# Patient Record
Sex: MALE | Race: WHITE | NOT HISPANIC OR LATINO
[De-identification: names, ages, dates, MRNs, and addresses within clinical notes are randomized per-mention and may not be internally consistent; named-entity substitution may affect disease eponyms.]

---

## 2017-01-04 ENCOUNTER — APPOINTMENT (OUTPATIENT)
Dept: PLASTIC SURGERY | Facility: CLINIC | Age: 57
End: 2017-01-04

## 2017-06-08 ENCOUNTER — APPOINTMENT (OUTPATIENT)
Dept: SURGICAL ONCOLOGY | Facility: CLINIC | Age: 57
End: 2017-06-08

## 2019-01-14 ENCOUNTER — LABORATORY RESULT (OUTPATIENT)
Age: 59
End: 2019-01-14

## 2019-01-14 ENCOUNTER — APPOINTMENT (OUTPATIENT)
Dept: INTERNAL MEDICINE | Facility: CLINIC | Age: 59
End: 2019-01-14
Payer: MEDICAID

## 2019-01-14 ENCOUNTER — OUTPATIENT (OUTPATIENT)
Dept: OUTPATIENT SERVICES | Facility: HOSPITAL | Age: 59
LOS: 1 days | End: 2019-01-14
Payer: MEDICAID

## 2019-01-14 VITALS
WEIGHT: 162 LBS | DIASTOLIC BLOOD PRESSURE: 70 MMHG | HEIGHT: 73 IN | BODY MASS INDEX: 21.47 KG/M2 | SYSTOLIC BLOOD PRESSURE: 130 MMHG

## 2019-01-14 DIAGNOSIS — Z87.19 PERSONAL HISTORY OF OTHER DISEASES OF THE DIGESTIVE SYSTEM: ICD-10-CM

## 2019-01-14 DIAGNOSIS — R20.2 ANESTHESIA OF SKIN: ICD-10-CM

## 2019-01-14 DIAGNOSIS — Z86.39 PERSONAL HISTORY OF OTHER ENDOCRINE, NUTRITIONAL AND METABOLIC DISEASE: ICD-10-CM

## 2019-01-14 DIAGNOSIS — Z87.898 PERSONAL HISTORY OF OTHER SPECIFIED CONDITIONS: ICD-10-CM

## 2019-01-14 DIAGNOSIS — Z82.3 FAMILY HISTORY OF STROKE: ICD-10-CM

## 2019-01-14 DIAGNOSIS — R20.0 ANESTHESIA OF SKIN: ICD-10-CM

## 2019-01-14 DIAGNOSIS — Z00.00 ENCOUNTER FOR GENERAL ADULT MEDICAL EXAMINATION W/OUT ABNORMAL FINDINGS: ICD-10-CM

## 2019-01-14 DIAGNOSIS — Z87.09 PERSONAL HISTORY OF OTHER DISEASES OF THE RESPIRATORY SYSTEM: ICD-10-CM

## 2019-01-14 DIAGNOSIS — Z87.39 PERSONAL HISTORY OF OTHER DISEASES OF THE MUSCULOSKELETAL SYSTEM AND CONNECTIVE TISSUE: ICD-10-CM

## 2019-01-14 DIAGNOSIS — K50.90 CROHN'S DISEASE, UNSPECIFIED, WITHOUT COMPLICATIONS: ICD-10-CM

## 2019-01-14 DIAGNOSIS — I10 ESSENTIAL (PRIMARY) HYPERTENSION: ICD-10-CM

## 2019-01-14 DIAGNOSIS — K52.9 NONINFECTIVE GASTROENTERITIS AND COLITIS, UNSPECIFIED: ICD-10-CM

## 2019-01-14 DIAGNOSIS — Z86.69 PERSONAL HISTORY OF OTHER DISEASES OF THE NERVOUS SYSTEM AND SENSE ORGANS: ICD-10-CM

## 2019-01-14 DIAGNOSIS — H93.90 UNSPECIFIED DISORDER OF EAR, UNSPECIFIED EAR: ICD-10-CM

## 2019-01-14 DIAGNOSIS — Z87.448 PERSONAL HISTORY OF OTHER DISEASES OF URINARY SYSTEM: ICD-10-CM

## 2019-01-14 DIAGNOSIS — Z80.9 FAMILY HISTORY OF MALIGNANT NEOPLASM, UNSPECIFIED: ICD-10-CM

## 2019-01-14 DIAGNOSIS — D03.39 MELANOMA IN SITU OF OTHER PARTS OF FACE: ICD-10-CM

## 2019-01-14 DIAGNOSIS — K50.90 CROHN'S DISEASE, UNSPECIFIED, W/OUT COMPLICATIONS: ICD-10-CM

## 2019-01-14 DIAGNOSIS — K31.9 DISEASE OF STOMACH AND DUODENUM, UNSPECIFIED: ICD-10-CM

## 2019-01-14 DIAGNOSIS — M54.5 LOW BACK PAIN: ICD-10-CM

## 2019-01-14 PROCEDURE — 99202 OFFICE O/P NEW SF 15 MIN: CPT | Mod: GE

## 2019-01-14 PROCEDURE — 80053 COMPREHEN METABOLIC PANEL: CPT

## 2019-01-14 PROCEDURE — G0463: CPT

## 2019-01-14 PROCEDURE — 80061 LIPID PANEL: CPT

## 2019-01-14 PROCEDURE — 83036 HEMOGLOBIN GLYCOSYLATED A1C: CPT

## 2019-01-14 PROCEDURE — 84443 ASSAY THYROID STIM HORMONE: CPT

## 2019-01-14 PROCEDURE — 85027 COMPLETE CBC AUTOMATED: CPT

## 2019-01-15 LAB
ALBUMIN SERPL ELPH-MCNC: 4.4 G/DL — SIGNIFICANT CHANGE UP (ref 3.3–5)
ALP SERPL-CCNC: 60 U/L — SIGNIFICANT CHANGE UP (ref 40–120)
ALT FLD-CCNC: 23 U/L — SIGNIFICANT CHANGE UP (ref 10–45)
ANION GAP SERPL CALC-SCNC: 12 MMOL/L — SIGNIFICANT CHANGE UP (ref 5–17)
AST SERPL-CCNC: 25 U/L — SIGNIFICANT CHANGE UP (ref 10–40)
BASOPHILS # BLD AUTO: 0.07 K/UL — SIGNIFICANT CHANGE UP (ref 0–0.2)
BASOPHILS NFR BLD AUTO: 0.8 % — SIGNIFICANT CHANGE UP (ref 0–2)
BILIRUB SERPL-MCNC: 0.6 MG/DL — SIGNIFICANT CHANGE UP (ref 0.2–1.2)
BUN SERPL-MCNC: 10 MG/DL — SIGNIFICANT CHANGE UP (ref 7–23)
CALCIUM SERPL-MCNC: 9.7 MG/DL — SIGNIFICANT CHANGE UP (ref 8.4–10.5)
CHLORIDE SERPL-SCNC: 104 MMOL/L — SIGNIFICANT CHANGE UP (ref 96–108)
CHOLEST SERPL-MCNC: 265 MG/DL — HIGH (ref 10–199)
CO2 SERPL-SCNC: 27 MMOL/L — SIGNIFICANT CHANGE UP (ref 22–31)
CREAT SERPL-MCNC: 0.79 MG/DL — SIGNIFICANT CHANGE UP (ref 0.5–1.3)
EOSINOPHIL # BLD AUTO: 0.25 K/UL — SIGNIFICANT CHANGE UP (ref 0–0.5)
EOSINOPHIL NFR BLD AUTO: 2.9 % — SIGNIFICANT CHANGE UP (ref 0–6)
GLUCOSE SERPL-MCNC: 82 MG/DL — SIGNIFICANT CHANGE UP (ref 70–99)
HBA1C BLD-MCNC: 5 % — SIGNIFICANT CHANGE UP (ref 4–5.6)
HCT VFR BLD CALC: 46.7 % — SIGNIFICANT CHANGE UP (ref 39–50)
HDLC SERPL-MCNC: 27 MG/DL — LOW
HGB BLD-MCNC: 15.1 G/DL — SIGNIFICANT CHANGE UP (ref 13–17)
IMM GRANULOCYTES NFR BLD AUTO: 0.6 % — SIGNIFICANT CHANGE UP (ref 0–1.5)
LIPID PNL WITH DIRECT LDL SERPL: 165 MG/DL — HIGH
LYMPHOCYTES # BLD AUTO: 1.61 K/UL — SIGNIFICANT CHANGE UP (ref 1–3.3)
LYMPHOCYTES # BLD AUTO: 18.8 % — SIGNIFICANT CHANGE UP (ref 13–44)
MANUAL SMEAR VERIFICATION: SIGNIFICANT CHANGE UP
MCHC RBC-ENTMCNC: 30.5 PG — SIGNIFICANT CHANGE UP (ref 27–34)
MCHC RBC-ENTMCNC: 32.3 GM/DL — SIGNIFICANT CHANGE UP (ref 32–36)
MCV RBC AUTO: 94.3 FL — SIGNIFICANT CHANGE UP (ref 80–100)
MONOCYTES # BLD AUTO: 0.43 K/UL — SIGNIFICANT CHANGE UP (ref 0–0.9)
MONOCYTES NFR BLD AUTO: 5 % — SIGNIFICANT CHANGE UP (ref 2–14)
NEUTROPHILS # BLD AUTO: 6.15 K/UL — SIGNIFICANT CHANGE UP (ref 1.8–7.4)
NEUTROPHILS NFR BLD AUTO: 71.9 % — SIGNIFICANT CHANGE UP (ref 43–77)
PLAT MORPH BLD: NORMAL — SIGNIFICANT CHANGE UP
PLATELET # BLD AUTO: 216 K/UL — SIGNIFICANT CHANGE UP (ref 150–400)
POTASSIUM SERPL-MCNC: 4.6 MMOL/L — SIGNIFICANT CHANGE UP (ref 3.5–5.3)
POTASSIUM SERPL-SCNC: 4.6 MMOL/L — SIGNIFICANT CHANGE UP (ref 3.5–5.3)
PROT SERPL-MCNC: 7.1 G/DL — SIGNIFICANT CHANGE UP (ref 6–8.3)
RBC # BLD: 4.95 M/UL — SIGNIFICANT CHANGE UP (ref 4.2–5.8)
RBC # FLD: 13.5 % — SIGNIFICANT CHANGE UP (ref 10.3–14.5)
RBC BLD AUTO: NORMAL — SIGNIFICANT CHANGE UP
SODIUM SERPL-SCNC: 143 MMOL/L — SIGNIFICANT CHANGE UP (ref 135–145)
T4 FREE+ TSH PNL SERPL: 2.25 UIU/ML — SIGNIFICANT CHANGE UP (ref 0.27–4.2)
TOTAL CHOLESTEROL/HDL RATIO MEASUREMENT: 9.8 RATIO — HIGH (ref 3.4–9.6)
TRIGL SERPL-MCNC: 366 MG/DL — HIGH (ref 10–149)
WBC # BLD: 8.56 K/UL — SIGNIFICANT CHANGE UP (ref 3.8–10.5)
WBC # FLD AUTO: 8.56 K/UL — SIGNIFICANT CHANGE UP (ref 3.8–10.5)

## 2019-01-19 PROBLEM — Z86.69 HISTORY OF HEARING LOSS: Status: RESOLVED | Noted: 2019-01-19 | Resolved: 2019-01-19

## 2019-01-19 PROBLEM — Z00.00 ENCOUNTER FOR WELLNESS EXAMINATION IN ADULT: Status: ACTIVE | Noted: 2019-01-19

## 2019-01-19 NOTE — PHYSICAL EXAM
[No Acute Distress] : no acute distress [Well-Appearing] : well-appearing [Normal Sclera/Conjunctiva] : normal sclera/conjunctiva [PERRL] : pupils equal round and reactive to light [EOMI] : extraocular movements intact [Normal Outer Ear/Nose] : the outer ears and nose were normal in appearance [Normal Oropharynx] : the oropharynx was normal [No JVD] : no jugular venous distention [Supple] : supple [Thyroid Normal, No Nodules] : the thyroid was normal and there were no nodules present [Clear to Auscultation] : lungs were clear to auscultation bilaterally [Normal Rate] : normal rate  [Regular Rhythm] : with a regular rhythm [Normal S1, S2] : normal S1 and S2 [No Murmur] : no murmur heard [No Carotid Bruits] : no carotid bruits [Pedal Pulses Present] : the pedal pulses are present [No Edema] : there was no peripheral edema [No Extremity Clubbing/Cyanosis] : no extremity clubbing/cyanosis [Soft] : abdomen soft [Non Tender] : non-tender [Non-distended] : non-distended [No Masses] : no abdominal mass palpated [No HSM] : no HSM [Normal Bowel Sounds] : normal bowel sounds [Normal Supraclavicular Nodes] : no supraclavicular lymphadenopathy [Normal Posterior Cervical Nodes] : no posterior cervical lymphadenopathy [Normal Anterior Cervical Nodes] : no anterior cervical lymphadenopathy [No Spinal Tenderness] : no spinal tenderness [No Joint Swelling] : no joint swelling [Grossly Normal Strength/Tone] : grossly normal strength/tone [No Skin Lesions] : no skin lesions [Normal Gait] : normal gait [Coordination Grossly Intact] : coordination grossly intact [No Focal Deficits] : no focal deficits [Normal Affect] : the affect was normal [Alert and Oriented x3] : oriented to person, place, and time [Normal Mood] : the mood was normal

## 2019-01-21 NOTE — HEALTH RISK ASSESSMENT
[Good] : ~his/her~  mood as  good [No falls in past year] : Patient reported no falls in the past year [0] : 2) Feeling down, depressed, or hopeless: Not at all (0) [Patient reported colonoscopy was normal] : Patient reported colonoscopy was normal [Alone] : lives alone [Employed] : employed [Single] : single [Sexually Active] : sexually active [Fully functional (bathing, dressing, toileting, transferring, walking, feeding)] : Fully functional (bathing, dressing, toileting, transferring, walking, feeding) [Fully functional (using the telephone, shopping, preparing meals, housekeeping, doing laundry, using] : Fully functional and needs no help or supervision to perform IADLs (using the telephone, shopping, preparing meals, housekeeping, doing laundry, using transportation, managing medications and managing finances) [] : No [de-identified] : Social alcohol use. [[id number]] : 0 [High Risk Behavior] : no high risk behavior [Reports changes in hearing] : Reports no changes in hearing [Reports changes in vision] : Reports no changes in vision [Reports changes in dental health] : Reports no changes in dental health [ColonoscopyDate] : 01/2016 [FreeTextEntry2] :

## 2019-01-21 NOTE — HISTORY OF PRESENT ILLNESS
[FreeTextEntry1] : 58M with PMH of Crohn's disease s/p partial colectomy (1994), chronic lower back pain s/p laminectomy x2 (2015) and L nasal melanoma s/p excision (2016) presenting to re-establish care.  [de-identified] : Last seen in clinic in 2013. Has history of melanoma and follows with dermatologist every 6 months, however, has not seen PCP since last clinic visit. Saw dermatologist last week for skin check and was told everything was normal. \par \par Otherwise reports feeling well. Has Crohn's disease that he reports is in remission. Sees gastroenterologist (Dr. Matt Cantu, [place]) and is being managed without medications. Denies symptoms of colitis. Had partial colectomy in 1994 and was treated with steroids for many years. Mentions that he "always has high cholesterol" due to his Crohn's disease. Was told this by a doctor in the past who said this is normal for him and does not need to be treated. Does not recall which doctor told him this. Last colonoscopy was in December 2016; was told everything is normal and to return in 3-5 years. \par \par Has chronic back pain for which he used to take various pain medications, but had two laminectomies at [place] (June and October 2015). After this, pain has been relatively well-controlled. Takes Tylenol PRN. Also does exercises he learned at physical therapy and swims several times per week.

## 2019-01-21 NOTE — REVIEW OF SYSTEMS
[Back Pain] : back pain [Negative] : Heme/Lymph [Fever] : no fever [Chills] : no chills [Recent Change In Weight] : ~T no recent weight change [Vision Problems] : no vision problems [Chest Pain] : no chest pain [Palpitations] : no palpitations [Lower Ext Edema] : no lower extremity edema [Orthopena] : no orthopnea [Paroysmal Nocturnal Dyspnea] : no paroysmal nocturnal dyspnea [Cough] : no cough [Dyspnea on Exertion] : not dyspnea on exertion [Abdominal Pain] : no abdominal pain [Nausea] : no nausea [Constipation] : no constipation [Diarrhea] : no diarrhea [Vomiting] : no vomiting [Melena] : no melena [Dysuria] : no dysuria [Nocturia] : no nocturia [Hematuria] : no hematuria [Joint Pain] : no joint pain [Joint Stiffness] : no joint stiffness [Muscle Pain] : no muscle pain [Muscle Weakness] : no muscle weakness [Joint Swelling] : no joint swelling [Mole Changes] : no mole changes [Skin Rash] : no skin rash [Headache] : no headache [Dizziness] : no dizziness [Insomnia] : no insomnia [Anxiety] : no anxiety [Depression] : no depression

## 2019-04-17 ENCOUNTER — APPOINTMENT (OUTPATIENT)
Dept: INTERNAL MEDICINE | Facility: CLINIC | Age: 59
End: 2019-04-17
Payer: MEDICAID

## 2019-04-17 ENCOUNTER — OUTPATIENT (OUTPATIENT)
Dept: OUTPATIENT SERVICES | Facility: HOSPITAL | Age: 59
LOS: 1 days | End: 2019-04-17
Payer: MEDICAID

## 2019-04-17 VITALS
HEIGHT: 73 IN | WEIGHT: 162 LBS | OXYGEN SATURATION: 97 % | DIASTOLIC BLOOD PRESSURE: 70 MMHG | SYSTOLIC BLOOD PRESSURE: 120 MMHG | HEART RATE: 80 BPM | BODY MASS INDEX: 21.47 KG/M2

## 2019-04-17 DIAGNOSIS — I10 ESSENTIAL (PRIMARY) HYPERTENSION: ICD-10-CM

## 2019-04-17 PROCEDURE — G0463: CPT

## 2019-04-17 PROCEDURE — 99213 OFFICE O/P EST LOW 20 MIN: CPT | Mod: GE

## 2019-04-19 ENCOUNTER — APPOINTMENT (OUTPATIENT)
Dept: SURGERY | Facility: CLINIC | Age: 59
End: 2019-04-19
Payer: MEDICAID

## 2019-04-19 ENCOUNTER — TRANSCRIPTION ENCOUNTER (OUTPATIENT)
Age: 59
End: 2019-04-19

## 2019-04-19 VITALS
HEIGHT: 73 IN | WEIGHT: 170 LBS | DIASTOLIC BLOOD PRESSURE: 78 MMHG | OXYGEN SATURATION: 98 % | SYSTOLIC BLOOD PRESSURE: 143 MMHG | HEART RATE: 85 BPM | TEMPERATURE: 97.9 F | RESPIRATION RATE: 15 BRPM | BODY MASS INDEX: 22.53 KG/M2

## 2019-04-19 PROCEDURE — 99204 OFFICE O/P NEW MOD 45 MIN: CPT

## 2019-04-19 RX ORDER — ACETAMINOPHEN 500 MG/1
500 TABLET, COATED ORAL
Refills: 0 | Status: ACTIVE | COMMUNITY

## 2019-04-19 NOTE — REASON FOR VISIT
[Consultation] : a consultation visit [FreeTextEntry1] : Referred by Dr. Umu Calderón Bilateral inguinal hernia

## 2019-04-19 NOTE — PHYSICAL EXAM
[Normal Breath Sounds] : Normal breath sounds [Normal Heart Sounds] : normal heart sounds [No HSM] : no hepatosplenomegaly [Tender] : was nontender [de-identified] : Within normal limits [de-identified] : Normal [de-identified] : Poor dentition otherwise normal [de-identified] : soft non Distended nontender\par Bilateral Inguinal hernias left greater than right

## 2019-04-19 NOTE — CONSULT LETTER
[Dear  ___] : Dear  [unfilled], [Please see my note below.] : Please see my note below. [Consult Letter:] : I had the pleasure of evaluating your patient, [unfilled]. [Consult Closing:] : Thank you very much for allowing me to participate in the care of this patient.  If you have any questions, please do not hesitate to contact me. [FreeTextEntry3] : Chaim Skelton MD, FACS, FASMBS\par Chief Division of Minimally Invasive Surgery\par Co-Director of Bariatric Surgery \par [facility]\[place] [address] [Sincerely,] : Sincerely,

## 2019-04-19 NOTE — HISTORY OF PRESENT ILLNESS
[de-identified] : Jarad is a 60 y/o male here for consultation for a left inguinal hernia. 59-year-old male here for evaluation initially for left inguinal hernia. He's had slight discomfort but no pain. He is here for evaluation for repair. He has a past history of Crohn's disease with surgery there

## 2019-04-19 NOTE — ASSESSMENT
[FreeTextEntry1] : 59-year-old male with bilateral inguinal hernias left greater than right. Given his previous surgical history including a lower midline incision laparoscopic approach is not indicated and would opt for 2 open procedures. I've explained this to him all his questions were answered the risks benefits and expectations were discussed at length with the patient he would like to proceed.

## 2019-04-19 NOTE — REVIEW OF SYSTEMS
[As Noted in HPI] : as noted in HPI [Negative] : Heme/Lymph [FreeTextEntry7] : Crohn's disease  [de-identified] : Previous history of melanoma resection of the face

## 2019-04-30 DIAGNOSIS — K40.90 UNILATERAL INGUINAL HERNIA, WITHOUT OBSTRUCTION OR GANGRENE, NOT SPECIFIED AS RECURRENT: ICD-10-CM

## 2019-04-30 NOTE — PLAN
[FreeTextEntry1] : 59M with PMH of Crohn's disease s/p partial colectomy (1994), chronic lower back pain s/p laminectomy x2 (2015) and L nasal melanoma s/p excision (2016) presenting after being found to have a left inguinal hernia\par \par Problem: left inguinal mass\par Assessment: likely inguinal hernia\par Plan: Surgery referral\par

## 2019-04-30 NOTE — HISTORY OF PRESENT ILLNESS
[FreeTextEntry1] : f/u [de-identified] : 59M with PMH of Crohn's disease s/p partial colectomy (1994), chronic lower back pain s/p laminectomy x2 (2015) and L nasal melanoma s/p excision (2016) presenting after being found to have a left inguinal hernia by his dermatologist earlier this week during a 6 month full body cancer screen. The patient reports he has no pain in the area and did not notice it before being told about it by his dermatologist. He is very active. He swims regularly and plays in a bowling league. He is requesting a referral to go see a surgeon about repair.

## 2019-04-30 NOTE — PHYSICAL EXAM
[No Acute Distress] : no acute distress [Well Nourished] : well nourished [Well Developed] : well developed [Normal Sclera/Conjunctiva] : normal sclera/conjunctiva [Well-Appearing] : well-appearing [No JVD] : no jugular venous distention [Normal Outer Ear/Nose] : the outer ears and nose were normal in appearance [No Respiratory Distress] : no respiratory distress  [Clear to Auscultation] : lungs were clear to auscultation bilaterally [No Accessory Muscle Use] : no accessory muscle use [Regular Rhythm] : with a regular rhythm [Normal Rate] : normal rate  [Normal S1, S2] : normal S1 and S2 [No Murmur] : no murmur heard [No Edema] : there was no peripheral edema [Soft] : abdomen soft [Non Tender] : non-tender [Non-distended] : non-distended [No Joint Swelling] : no joint swelling [No Rash] : no rash [Normal Gait] : normal gait [Normal Affect] : the affect was normal [Normal Insight/Judgement] : insight and judgment were intact [de-identified] : well healed old abdominal scars anterior abdomen [de-identified] : mass left inguinal area. testicles appear normal in size and shape. No TTP to palpation of testicles, no fullness.

## 2019-05-06 ENCOUNTER — OUTPATIENT (OUTPATIENT)
Dept: OUTPATIENT SERVICES | Facility: HOSPITAL | Age: 59
LOS: 1 days | End: 2019-05-06
Payer: MEDICAID

## 2019-05-06 VITALS
OXYGEN SATURATION: 96 % | SYSTOLIC BLOOD PRESSURE: 125 MMHG | WEIGHT: 169.98 LBS | DIASTOLIC BLOOD PRESSURE: 82 MMHG | HEIGHT: 73 IN | TEMPERATURE: 98 F | RESPIRATION RATE: 14 BRPM | HEART RATE: 80 BPM

## 2019-05-06 DIAGNOSIS — K40.20 BILATERAL INGUINAL HERNIA, WITHOUT OBSTRUCTION OR GANGRENE, NOT SPECIFIED AS RECURRENT: ICD-10-CM

## 2019-05-06 DIAGNOSIS — Z98.890 OTHER SPECIFIED POSTPROCEDURAL STATES: Chronic | ICD-10-CM

## 2019-05-06 DIAGNOSIS — Z01.84 ENCOUNTER FOR ANTIBODY RESPONSE EXAMINATION: ICD-10-CM

## 2019-05-06 LAB
ANION GAP SERPL CALC-SCNC: 12 MMOL/L — SIGNIFICANT CHANGE UP (ref 5–17)
BUN SERPL-MCNC: 10 MG/DL — SIGNIFICANT CHANGE UP (ref 7–23)
CALCIUM SERPL-MCNC: 9.5 MG/DL — SIGNIFICANT CHANGE UP (ref 8.4–10.5)
CHLORIDE SERPL-SCNC: 107 MMOL/L — SIGNIFICANT CHANGE UP (ref 96–108)
CO2 SERPL-SCNC: 24 MMOL/L — SIGNIFICANT CHANGE UP (ref 22–31)
CREAT SERPL-MCNC: 0.91 MG/DL — SIGNIFICANT CHANGE UP (ref 0.5–1.3)
GLUCOSE SERPL-MCNC: 93 MG/DL — SIGNIFICANT CHANGE UP (ref 70–99)
HCT VFR BLD CALC: 47.1 % — SIGNIFICANT CHANGE UP (ref 39–50)
HGB BLD-MCNC: 15.4 G/DL — SIGNIFICANT CHANGE UP (ref 13–17)
MCHC RBC-ENTMCNC: 30.6 PG — SIGNIFICANT CHANGE UP (ref 27–34)
MCHC RBC-ENTMCNC: 32.7 GM/DL — SIGNIFICANT CHANGE UP (ref 32–36)
MCV RBC AUTO: 93.5 FL — SIGNIFICANT CHANGE UP (ref 80–100)
PLATELET # BLD AUTO: 226 K/UL — SIGNIFICANT CHANGE UP (ref 150–400)
POTASSIUM SERPL-MCNC: 4.3 MMOL/L — SIGNIFICANT CHANGE UP (ref 3.5–5.3)
POTASSIUM SERPL-SCNC: 4.3 MMOL/L — SIGNIFICANT CHANGE UP (ref 3.5–5.3)
RBC # BLD: 5.04 M/UL — SIGNIFICANT CHANGE UP (ref 4.2–5.8)
RBC # FLD: 12.4 % — SIGNIFICANT CHANGE UP (ref 10.3–14.5)
SODIUM SERPL-SCNC: 143 MMOL/L — SIGNIFICANT CHANGE UP (ref 135–145)
WBC # BLD: 7 K/UL — SIGNIFICANT CHANGE UP (ref 3.8–10.5)
WBC # FLD AUTO: 7 K/UL — SIGNIFICANT CHANGE UP (ref 3.8–10.5)

## 2019-05-06 PROCEDURE — 85027 COMPLETE CBC AUTOMATED: CPT

## 2019-05-06 PROCEDURE — 80048 BASIC METABOLIC PNL TOTAL CA: CPT

## 2019-05-06 PROCEDURE — G0463: CPT

## 2019-05-06 RX ORDER — SODIUM CHLORIDE 9 MG/ML
3 INJECTION INTRAMUSCULAR; INTRAVENOUS; SUBCUTANEOUS EVERY 8 HOURS
Refills: 0 | Status: DISCONTINUED | OUTPATIENT
Start: 2019-05-16 | End: 2019-05-31

## 2019-05-06 RX ORDER — CELECOXIB 200 MG/1
200 CAPSULE ORAL ONCE
Refills: 0 | Status: DISCONTINUED | OUTPATIENT
Start: 2019-05-16 | End: 2019-05-31

## 2019-05-06 RX ORDER — CHLORHEXIDINE GLUCONATE 213 G/1000ML
1 SOLUTION TOPICAL DAILY
Refills: 0 | Status: DISCONTINUED | OUTPATIENT
Start: 2019-05-16 | End: 2019-05-31

## 2019-05-06 RX ORDER — ACETAMINOPHEN 500 MG
1000 TABLET ORAL ONCE
Refills: 0 | Status: DISCONTINUED | OUTPATIENT
Start: 2019-05-16 | End: 2019-05-31

## 2019-05-06 RX ORDER — LIDOCAINE HCL 20 MG/ML
0.2 VIAL (ML) INJECTION ONCE
Refills: 0 | Status: DISCONTINUED | OUTPATIENT
Start: 2019-05-16 | End: 2019-05-31

## 2019-05-06 NOTE — H&P PST ADULT - NSICDXPASTSURGICALHX_GEN_ALL_CORE_FT
PAST SURGICAL HISTORY:  H/O laminectomy L5-S1 x2  6/2015, 10/2015    H/O melanoma excision of nose 10/2016    S/P small bowel resection

## 2019-05-06 NOTE — H&P PST ADULT - NSANTHOSAYNRD_GEN_A_CORE
No. SUZE screening performed.  STOP BANG Legend: 0-2 = LOW Risk; 3-4 = INTERMEDIATE Risk; 5-8 = HIGH Risk

## 2019-05-06 NOTE — H&P PST ADULT - ATTENDING COMMENTS
no
Pt seen and examined  Agree with note which was reviewed and edited where appropriate.  D/W patient, RN, residents and Fellow

## 2019-05-06 NOTE — H&P PST ADULT - MUSCULOSKELETAL COMMENTS
gait steady intermittent low back pain, occasionally associated with left posterior muscle spasms, takes tylenol prn with sufficient pain relief

## 2019-05-07 PROBLEM — Z98.890 OTHER SPECIFIED POSTPROCEDURAL STATES: Chronic | Status: ACTIVE | Noted: 2019-05-06

## 2019-05-10 ENCOUNTER — APPOINTMENT (OUTPATIENT)
Dept: SURGERY | Facility: CLINIC | Age: 59
End: 2019-05-10

## 2019-05-15 ENCOUNTER — TRANSCRIPTION ENCOUNTER (OUTPATIENT)
Age: 59
End: 2019-05-15

## 2019-05-16 ENCOUNTER — APPOINTMENT (OUTPATIENT)
Dept: SURGERY | Facility: HOSPITAL | Age: 59
End: 2019-05-16
Payer: MEDICAID

## 2019-05-16 ENCOUNTER — OUTPATIENT (OUTPATIENT)
Dept: OUTPATIENT SERVICES | Facility: HOSPITAL | Age: 59
LOS: 1 days | End: 2019-05-16
Payer: MEDICAID

## 2019-05-16 VITALS
SYSTOLIC BLOOD PRESSURE: 118 MMHG | HEART RATE: 89 BPM | RESPIRATION RATE: 16 BRPM | TEMPERATURE: 98 F | OXYGEN SATURATION: 98 % | DIASTOLIC BLOOD PRESSURE: 62 MMHG

## 2019-05-16 VITALS
HEIGHT: 73 IN | DIASTOLIC BLOOD PRESSURE: 87 MMHG | OXYGEN SATURATION: 98 % | RESPIRATION RATE: 18 BRPM | HEART RATE: 88 BPM | WEIGHT: 169.98 LBS | SYSTOLIC BLOOD PRESSURE: 122 MMHG | TEMPERATURE: 98 F

## 2019-05-16 DIAGNOSIS — K40.20 BILATERAL INGUINAL HERNIA, WITHOUT OBSTRUCTION OR GANGRENE, NOT SPECIFIED AS RECURRENT: ICD-10-CM

## 2019-05-16 DIAGNOSIS — Z98.890 OTHER SPECIFIED POSTPROCEDURAL STATES: Chronic | ICD-10-CM

## 2019-05-16 PROCEDURE — 49505 PRP I/HERN INIT REDUC >5 YR: CPT | Mod: 50

## 2019-05-16 PROCEDURE — 49505 PRP I/HERN INIT REDUC >5 YR: CPT | Mod: 82,50

## 2019-05-16 RX ORDER — CELECOXIB 200 MG/1
200 CAPSULE ORAL ONCE
Refills: 0 | Status: DISCONTINUED | OUTPATIENT
Start: 2019-05-16 | End: 2019-05-16

## 2019-05-16 RX ORDER — CEFAZOLIN SODIUM 1 G
2000 VIAL (EA) INJECTION ONCE
Refills: 0 | Status: COMPLETED | OUTPATIENT
Start: 2019-05-16 | End: 2019-05-16

## 2019-05-16 RX ORDER — OXYCODONE HYDROCHLORIDE 5 MG/1
5 TABLET ORAL ONCE
Refills: 0 | Status: DISCONTINUED | OUTPATIENT
Start: 2019-05-16 | End: 2019-05-16

## 2019-05-16 RX ORDER — ONDANSETRON 8 MG/1
4 TABLET, FILM COATED ORAL ONCE
Refills: 0 | Status: DISCONTINUED | OUTPATIENT
Start: 2019-05-16 | End: 2019-05-16

## 2019-05-16 RX ORDER — OXYCODONE HYDROCHLORIDE 5 MG/1
10 TABLET ORAL ONCE
Refills: 0 | Status: DISCONTINUED | OUTPATIENT
Start: 2019-05-16 | End: 2019-05-16

## 2019-05-16 RX ORDER — FAMOTIDINE 10 MG/ML
20 INJECTION INTRAVENOUS ONCE
Refills: 0 | Status: COMPLETED | OUTPATIENT
Start: 2019-05-16 | End: 2019-05-16

## 2019-05-16 RX ORDER — TAPENTADOL HYDROCHLORIDE 50 MG/1
1 TABLET, FILM COATED ORAL
Qty: 10 | Refills: 0
Start: 2019-05-16

## 2019-05-16 RX ORDER — TRAMADOL HYDROCHLORIDE 50 MG/1
50 TABLET ORAL ONCE
Refills: 0 | Status: DISCONTINUED | OUTPATIENT
Start: 2019-05-16 | End: 2019-05-16

## 2019-05-16 RX ORDER — SODIUM CHLORIDE 9 MG/ML
1000 INJECTION, SOLUTION INTRAVENOUS
Refills: 0 | Status: DISCONTINUED | OUTPATIENT
Start: 2019-05-16 | End: 2019-05-31

## 2019-05-16 RX ADMIN — TRAMADOL HYDROCHLORIDE 50 MILLIGRAM(S): 50 TABLET ORAL at 14:35

## 2019-05-16 RX ADMIN — TRAMADOL HYDROCHLORIDE 50 MILLIGRAM(S): 50 TABLET ORAL at 15:05

## 2019-05-16 RX ADMIN — CHLORHEXIDINE GLUCONATE 1 APPLICATION(S): 213 SOLUTION TOPICAL at 10:29

## 2019-05-16 RX ADMIN — FAMOTIDINE 20 MILLIGRAM(S): 10 INJECTION INTRAVENOUS at 10:27

## 2019-05-16 NOTE — ASU DISCHARGE PLAN (ADULT/PEDIATRIC) - ASU DC SPECIAL INSTRUCTIONSFT
Take Tylenol extra-strength routinely for first couple of days  Can alternate with ibuprofen if needed  Use Nucynta for breakthrough if needed  Swelling and bruising is normal, can use ice to reduce the swelling in your scrotum

## 2019-05-16 NOTE — ASU DISCHARGE PLAN (ADULT/PEDIATRIC) - CARE PROVIDER_API CALL
Chaim Skelton)  Surgery  [address], [address]  [place] [address]  Phone: [phone]  Fax: [phone]  Follow Up Time: 2 weeks

## 2019-05-16 NOTE — ASU PATIENT PROFILE, ADULT - PSH
H/O laminectomy  L5-S1 x2  6/2015, 10/2015  H/O melanoma excision  of nose 10/2016  S/P small bowel resection

## 2019-05-16 NOTE — PRE-ANESTHESIA EVALUATION ADULT - NSANTHPMHFT_GEN_ALL_CORE
cervical disc disease MRI-moderate to severe B/L neural foraminal stenosis, c4-5 and c6-7  Bowel resection 1994, 2015  Laminectomy  L5-S1 6/15  passed out on an airplane trip due to changes in altitude 8/15

## 2019-05-16 NOTE — PRE-ANESTHESIA EVALUATION ADULT - NSANTHALCOHOLSD_GEN_ALL_CORE
Discussion/Summary   The hip X ray does not show arthritis, pain is probably from bursitis of the hip. Medication for pain relief recommended is still anti-inflammatory like Ibuprofen or Aleve. Verified Results  XR HIP RT 2V [id number] 01:11PM Gaurav Garcia     Test Name Result Flag Reference   XR HIP RT 2V (Report)     Accession #    [id number]    EXAM: XR HIP RT 2V    CLINICAL INDICATION: Chronic right hip pain. 2 views    No priors. Hip joint spaces preserved. Mild exuberant changes about the lateral acetabular margin. No   fractures or subluxations. No osteonecrosis. Also, calcification is seen within the right iliac   and femoral arteries. There are arthritic changes in the lower lumbar spine and disc disease. IMPRESSION: No right hip osteoarthritis or other lesion.     **** F I N A L ****    Transcribed By: EL   11/06/17 3:33 pm    Dictated By:      Annette Sauer MD    Electronically Reviewed and Approved By:      Annette Sauer MD 11/06/17 3:35 pm Yes/social

## 2019-05-16 NOTE — ASU DISCHARGE PLAN (ADULT/PEDIATRIC) - CALL YOUR DOCTOR IF YOU HAVE ANY OF THE FOLLOWING:
Wound/Surgical Site with redness, or foul smelling discharge or pus/Nausea and vomiting that does not stop/Unable to urinate Numbness, tingling, color or temperature change to extremity/Bleeding that does not stop/Fever greater than (need to indicate Fahrenheit or Celsius)/Wound/Surgical Site with redness, or foul smelling discharge or pus/Pain not relieved by Medications

## 2019-05-17 ENCOUNTER — RESULT REVIEW (OUTPATIENT)
Age: 59
End: 2019-05-17

## 2019-05-17 PROCEDURE — C1889: CPT

## 2019-05-17 PROCEDURE — 88304 TISSUE EXAM BY PATHOLOGIST: CPT | Mod: 26

## 2019-05-17 PROCEDURE — C1781: CPT

## 2019-05-17 PROCEDURE — 49505 PRP I/HERN INIT REDUC >5 YR: CPT | Mod: 50

## 2019-05-17 PROCEDURE — 88304 TISSUE EXAM BY PATHOLOGIST: CPT

## 2019-06-03 ENCOUNTER — APPOINTMENT (OUTPATIENT)
Dept: SURGERY | Facility: CLINIC | Age: 59
End: 2019-06-03
Payer: MEDICAID

## 2019-06-03 VITALS
DIASTOLIC BLOOD PRESSURE: 77 MMHG | RESPIRATION RATE: 15 BRPM | OXYGEN SATURATION: 99 % | HEART RATE: 81 BPM | TEMPERATURE: 97.6 F | SYSTOLIC BLOOD PRESSURE: 116 MMHG

## 2019-06-03 PROCEDURE — 99024 POSTOP FOLLOW-UP VISIT: CPT

## 2019-08-05 ENCOUNTER — APPOINTMENT (OUTPATIENT)
Dept: SURGERY | Facility: CLINIC | Age: 59
End: 2019-08-05
Payer: MEDICAID

## 2019-08-05 VITALS
DIASTOLIC BLOOD PRESSURE: 79 MMHG | TEMPERATURE: 98 F | RESPIRATION RATE: 16 BRPM | OXYGEN SATURATION: 98 % | SYSTOLIC BLOOD PRESSURE: 127 MMHG | HEART RATE: 78 BPM

## 2019-08-05 DIAGNOSIS — Z87.19 PERSONAL HISTORY OF OTHER DISEASES OF THE DIGESTIVE SYSTEM: ICD-10-CM

## 2019-08-05 PROCEDURE — 99024 POSTOP FOLLOW-UP VISIT: CPT

## 2019-08-05 NOTE — HISTORY OF PRESENT ILLNESS
[de-identified] : 59-year-old male status post open bilateral inguinal hernia repairs back for a checkup doing very well

## 2019-08-05 NOTE — REASON FOR VISIT
[Post Op: _________] : a [unfilled] post op visit [FreeTextEntry1] : Open repair of bilateral inguinal hernia with plug and patch, bilateral indirect inguinal hernias.

## 2019-08-05 NOTE — ASSESSMENT
[FreeTextEntry1] : Doing very well after open bilateral inguinal hernia repair all of his questions were answered the patient will followup as needed

## 2019-08-05 NOTE — PHYSICAL EXAM
[de-identified] : ambulating without difficulty [de-identified] : soft nonter non detended healed bilateral inguinal hernia scars

## 2020-01-08 ENCOUNTER — OTHER (OUTPATIENT)
Age: 60
End: 2020-01-08

## 2020-02-03 ENCOUNTER — OUTPATIENT (OUTPATIENT)
Dept: OUTPATIENT SERVICES | Facility: HOSPITAL | Age: 60
LOS: 1 days | End: 2020-02-03
Payer: MEDICAID

## 2020-02-03 ENCOUNTER — APPOINTMENT (OUTPATIENT)
Dept: INTERNAL MEDICINE | Facility: CLINIC | Age: 60
End: 2020-02-03
Payer: MEDICAID

## 2020-02-03 VITALS
BODY MASS INDEX: 23.46 KG/M2 | WEIGHT: 177 LBS | DIASTOLIC BLOOD PRESSURE: 80 MMHG | HEIGHT: 73 IN | SYSTOLIC BLOOD PRESSURE: 112 MMHG

## 2020-02-03 DIAGNOSIS — I10 ESSENTIAL (PRIMARY) HYPERTENSION: ICD-10-CM

## 2020-02-03 DIAGNOSIS — G47.62 SLEEP RELATED LEG CRAMPS: ICD-10-CM

## 2020-02-03 DIAGNOSIS — Z98.890 OTHER SPECIFIED POSTPROCEDURAL STATES: Chronic | ICD-10-CM

## 2020-02-03 PROCEDURE — 99213 OFFICE O/P EST LOW 20 MIN: CPT | Mod: GE

## 2020-02-03 PROCEDURE — G0463: CPT

## 2020-02-04 NOTE — PLAN
[FreeTextEntry1] : #Nocturnal Leg Cramps\par - Patient with sleep related leg cramps, improved with movement and exercise \par - CMP to assess for electrolyte abnormalities \par - encouraged to use heat therapy at night with topical treatment such as Icy hot and Bengay to help alleviate symptoms  \par - PT referral for leg cramps \par \par #HCM\par - CBC and lipid profile \par - Up to date with immunizations \par \par D/w Dr. Washington

## 2020-02-04 NOTE — HISTORY OF PRESENT ILLNESS
[FreeTextEntry1] : CPE [de-identified] : Mr. Chambers is a 58 y/o male with pmhx Chron's disease s/p colectomy, chroniclower back paion s/p laminectomy and bilaterla ingunial surgery presents for CPE. Patient states that with several weeks of left lower leg cramping with radiation from the hip. Occurs at night and when lying down. Patient states that the pain can wake him up at night  requiring movement fir alleviation of the pain. Patient states that exercise improves the pain and waking around improves it. Patient states that that he loses sleep because it wakes him up at night. 10/10 pain, alleviates within one minute of walking. Takes Tylenol for back pain. Denies any CP, HA, dizziness, abdominal pain, nausea, vomiting, vision changes, edema, Denies any recent hospitalizations, ED visits or urgent care visits.

## 2020-02-04 NOTE — PHYSICAL EXAM
[Well Nourished] : well nourished [No Acute Distress] : no acute distress [Well Developed] : well developed [Well-Appearing] : well-appearing [Normal Sclera/Conjunctiva] : normal sclera/conjunctiva [PERRL] : pupils equal round and reactive to light [EOMI] : extraocular movements intact [Normal Outer Ear/Nose] : the outer ears and nose were normal in appearance [Normal Oropharynx] : the oropharynx was normal [No Respiratory Distress] : no respiratory distress  [Clear to Auscultation] : lungs were clear to auscultation bilaterally [No Accessory Muscle Use] : no accessory muscle use [Regular Rhythm] : with a regular rhythm [Normal S1, S2] : normal S1 and S2 [Normal Rate] : normal rate  [No Murmur] : no murmur heard [Soft] : abdomen soft [Non-distended] : non-distended [Non Tender] : non-tender [Normal Bowel Sounds] : normal bowel sounds [No HSM] : no HSM [No Masses] : no abdominal mass palpated [No CVA Tenderness] : no CVA  tenderness [Normal Anterior Cervical Nodes] : no anterior cervical lymphadenopathy [Normal Posterior Cervical Nodes] : no posterior cervical lymphadenopathy [No Joint Swelling] : no joint swelling [No Spinal Tenderness] : no spinal tenderness [No Rash] : no rash [Grossly Normal Strength/Tone] : grossly normal strength/tone [de-identified] : FULL ROM on RLE

## 2020-02-04 NOTE — HEALTH RISK ASSESSMENT
[Very Good] : ~his/her~  mood as very good [Monthly or less (1 pt)] : Monthly or less (1 point) [Yes] : Yes [1 or 2 (0 pts)] : 1 or 2 (0 points) [Never (0 pts)] : Never (0 points) [Employed] : employed [Alone] : lives alone [Fully functional (using the telephone, shopping, preparing meals, housekeeping, doing laundry, using] : Fully functional and needs no help or supervision to perform IADLs (using the telephone, shopping, preparing meals, housekeeping, doing laundry, using transportation, managing medications and managing finances) [Fully functional (bathing, dressing, toileting, transferring, walking, feeding)] : Fully functional (bathing, dressing, toileting, transferring, walking, feeding) [FreeTextEntry1] : muscle cramps in leg  [de-identified] : ocasionally 1 wine glass wine  [de-identified] : swims regualrly and bowling  [] : No [de-identified] : [employer] at firm

## 2020-02-05 DIAGNOSIS — G47.62 SLEEP RELATED LEG CRAMPS: ICD-10-CM

## 2020-02-06 LAB
BASOPHILS # BLD AUTO: 0.12 K/UL
BASOPHILS NFR BLD AUTO: 1.6 %
CHOLEST SERPL-MCNC: 271 MG/DL
CHOLEST/HDLC SERPL: 9.7 RATIO
EOSINOPHIL # BLD AUTO: 0.33 K/UL
EOSINOPHIL NFR BLD AUTO: 4.3 %
HCT VFR BLD CALC: 48.9 %
HDLC SERPL-MCNC: 28 MG/DL
HGB BLD-MCNC: 15.7 G/DL
IMM GRANULOCYTES NFR BLD AUTO: 0.8 %
LDLC SERPL CALC-MCNC: NORMAL MG/DL
LYMPHOCYTES # BLD AUTO: 1.42 K/UL
LYMPHOCYTES NFR BLD AUTO: 18.5 %
MAN DIFF?: NORMAL
MCHC RBC-ENTMCNC: 30.8 PG
MCHC RBC-ENTMCNC: 32.1 GM/DL
MCV RBC AUTO: 96.1 FL
MONOCYTES # BLD AUTO: 0.61 K/UL
MONOCYTES NFR BLD AUTO: 8 %
NEUTROPHILS # BLD AUTO: 5.13 K/UL
NEUTROPHILS NFR BLD AUTO: 66.8 %
PLATELET # BLD AUTO: 222 K/UL
RBC # BLD: 5.09 M/UL
RBC # FLD: 13 %
TRIGL SERPL-MCNC: 630 MG/DL
WBC # FLD AUTO: 7.67 K/UL

## 2020-05-22 ENCOUNTER — TRANSCRIPTION ENCOUNTER (OUTPATIENT)
Age: 60
End: 2020-05-22

## 2021-01-16 ENCOUNTER — TRANSCRIPTION ENCOUNTER (OUTPATIENT)
Age: 61
End: 2021-01-16

## 2022-01-11 NOTE — H&P PST ADULT - HISTORY OF PRESENT ILLNESS
Christophe Hatch, hopefully today. Lovenox or Heparin coverage per others. Hold for PPM.  Serial labs. ECGs Telemetry  Further orders to come  See orders. OBJECTIVE:     MEDICATIONS:     Scheduled Meds:   sodium chloride flush  5-40 mL IntraVENous 2 times per day    vancomycin (VANCOCIN) IV  1,000 mg IntraVENous On Call to OR    [START ON 1/12/2022] losartan  100 mg Oral Daily    amLODIPine  5 mg Oral Daily    pantoprazole  40 mg Oral QAM AC    [Held by provider] oxybutynin  15 mg Oral Daily    levothyroxine  112 mcg Oral Daily    sodium chloride flush  5-40 mL IntraVENous 2 times per day     Continuous Infusions:   sodium chloride 75 mL/hr at 01/11/22 0500    sodium chloride      DOPamine 2.5 mcg/kg/min (01/08/22 1425)    sodium chloride       PRN Meds:sodium chloride flush, sodium chloride, acetaminophen, hydrALAZINE, sodium chloride flush, sodium chloride, ondansetron, morphine **OR** morphine    PHYSICAL EXAM:    CURRENT VITALS: /62   Pulse 61   Temp 98.3 °F (36.8 °C) (Oral)   Resp 17   Ht 5' 5\" (1.651 m)   Wt 203 lb 11.3 oz (92.4 kg)   SpO2 97%   BMI 33.90 kg/m²     Not Formally / directly examined due to COVID protocol. Discussed with staff.        Data:       LABS:  Recent Results (from the past 24 hour(s))   CBC Auto Differential    Collection Time: 01/10/22  1:54 PM   Result Value Ref Range    WBC 15.3 (H) 4.8 - 10.8 K/uL    RBC 4.41 4.20 - 5.40 M/uL    Hemoglobin 11.8 (L) 12.0 - 16.0 g/dL    Hematocrit 36.0 (L) 37.0 - 47.0 %    MCV 81.8 (L) 82.0 - 100.0 fL    MCH 26.9 (L) 27.0 - 31.3 pg    MCHC 32.9 (L) 33.0 - 37.0 %    RDW 14.3 11.5 - 14.5 %    Platelets 428 508 - 622 K/uL    Neutrophils % 80.1 %    Lymphocytes % 9.6 %    Monocytes % 8.1 %    Eosinophils % 1.7 %    Basophils % 0.5 %    Neutrophils Absolute 12.3 (H) 1.4 - 6.5 K/uL    Lymphocytes Absolute 1.5 1.0 - 4.8 K/uL    Monocytes Absolute 1.2 (H) 0.2 - 0.8 K/uL    Eosinophils Absolute 0.3 0.0 - 0.7 K/uL    Basophils Absolute 0.1 0.0 - 0.2 K/uL   CBC Auto Differential    Collection Time: 01/11/22  5:15 AM   Result Value Ref Range    WBC 12.9 (H) 4.8 - 10.8 K/uL    RBC 4.43 4.20 - 5.40 M/uL    Hemoglobin 11.9 (L) 12.0 - 16.0 g/dL    Hematocrit 36.7 (L) 37.0 - 47.0 %    MCV 82.8 82.0 - 100.0 fL    MCH 26.9 (L) 27.0 - 31.3 pg    MCHC 32.6 (L) 33.0 - 37.0 %    RDW 14.0 11.5 - 14.5 %    Platelets 214 943 - 853 K/uL    Neutrophils % 80.3 %    Lymphocytes % 8.1 %    Monocytes % 9.8 %    Eosinophils % 1.5 %    Basophils % 0.3 %    Neutrophils Absolute 10.4 (H) 1.4 - 6.5 K/uL    Lymphocytes Absolute 1.0 1.0 - 4.8 K/uL    Monocytes Absolute 1.3 (H) 0.2 - 0.8 K/uL    Eosinophils Absolute 0.2 0.0 - 0.7 K/uL    Basophils Absolute 0.0 0.0 - 0.2 K/uL   Comprehensive Metabolic Panel    Collection Time: 01/11/22  5:15 AM   Result Value Ref Range    Sodium 134 (L) 135 - 144 mEq/L    Potassium 3.8 3.4 - 4.9 mEq/L    Chloride 98 95 - 107 mEq/L    CO2 24 20 - 31 mEq/L    Anion Gap 12 9 - 15 mEq/L    Glucose 121 (H) 70 - 99 mg/dL    BUN 14 8 - 23 mg/dL    CREATININE 0.36 (L) 0.50 - 0.90 mg/dL    GFR Non-African American >60.0 >60    GFR  >60.0 >60    Calcium 8.8 8.5 - 9.9 mg/dL    Total Protein 5.7 (L) 6.3 - 8.0 g/dL    Albumin 2.9 (L) 3.5 - 4.6 g/dL    Total Bilirubin 0.5 0.2 - 0.7 mg/dL    Alkaline Phosphatase 82 40 - 130 U/L    ALT 15 0 - 33 U/L    AST 16 0 - 35 U/L    Globulin 2.8 2.3 - 3.5 g/dL   Magnesium    Collection Time: 01/11/22  5:15 AM   Result Value Ref Range    Magnesium 1.9 1.7 - 2.4 mg/dL   EKG 12 Lead    Collection Time: 01/11/22  6:55 AM   Result Value Ref Range    Ventricular Rate 107 BPM    Atrial Rate 117 BPM    QRS Duration 98 ms    Q-T Interval 396 ms    QTc Calculation (Bazett) 528 ms    R Axis -26 degrees    T Axis 36 degrees       Cardiac Cath  \12/2019  1.  Normal cardiac catheterization. 2.  Normal coronary cineangiography without evidence of significant coronary artery disease as noted above.   4.  Normal LV Function, LVEF 60%  4.  Normal hemodynamics. 5.  No significant valvular heart disease. EKG:   Sinus rhythm with 1st degree AV block, rate 77.    Incomplete left bundle branch block   Left ventricular hypertrophy with repolarization abnormality   Abnormal ECG       Echo: See Report            Dat Gonsales MD ,[address] [facility] 60 yo male. 60 yo male.       Jarad is a 58 y/o male here for consultation for a left inguinal hernia. 59-year-old male here for evaluation initially for left inguinal hernia. He's had slight discomfort but no pain. He is here for evaluation for repair. He has a past history of Crohn's disease with surgery there. 60 yo male. PMH Crohn's disease (s/p resection 1994) - currently in remission, melanoma of nose (s/p wide excision 2016).  incidental finding of bilateral inguinal hernias on whole body scan as part of follow up scan for h/o melanoma.  presents to PST scheduled for surgical repair. pt c/o occasional "tightness" of right inguinal area, mild bulge noted that is reducible manually when lying supine. 60 yo male. PMH Crohn's disease (s/p resection 1994) - currently in remission, melanoma of nose (s/p wide excision 2016).  incidental finding of bilateral inguinal hernias on whole body scan as part of follow up scan for h/o melanoma.  presents to PST scheduled for surgical repair. pt c/o occasional "tightness" of left inguinal area, mild bulge noted that is reducible manually when lying supine.  *** pt requests to have Dr. Lowery to his anesthesiologist during the upcoming surgery- s/w anesthesiologist office.***

## 2023-02-09 ENCOUNTER — NON-APPOINTMENT (OUTPATIENT)
Age: 63
End: 2023-02-09

## 2023-02-09 ENCOUNTER — APPOINTMENT (OUTPATIENT)
Dept: SURGICAL ONCOLOGY | Facility: CLINIC | Age: 63
End: 2023-02-09
Payer: COMMERCIAL

## 2023-02-09 VITALS
WEIGHT: 170 LBS | SYSTOLIC BLOOD PRESSURE: 145 MMHG | BODY MASS INDEX: 22.53 KG/M2 | OXYGEN SATURATION: 98 % | RESPIRATION RATE: 16 BRPM | DIASTOLIC BLOOD PRESSURE: 82 MMHG | HEART RATE: 76 BPM | HEIGHT: 73 IN

## 2023-02-09 PROCEDURE — 99204 OFFICE O/P NEW MOD 45 MIN: CPT

## 2023-02-17 ENCOUNTER — RESULT REVIEW (OUTPATIENT)
Age: 63
End: 2023-02-17

## 2023-02-21 ENCOUNTER — OUTPATIENT (OUTPATIENT)
Dept: OUTPATIENT SERVICES | Facility: HOSPITAL | Age: 63
LOS: 1 days | End: 2023-02-21
Payer: COMMERCIAL

## 2023-02-21 DIAGNOSIS — Z98.890 OTHER SPECIFIED POSTPROCEDURAL STATES: Chronic | ICD-10-CM

## 2023-02-21 DIAGNOSIS — D03.39 MELANOMA IN SITU OF OTHER PARTS OF FACE: ICD-10-CM

## 2023-02-21 PROCEDURE — 88321 CONSLTJ&REPRT SLD PREP ELSWR: CPT

## 2023-02-22 ENCOUNTER — OUTPATIENT (OUTPATIENT)
Dept: OUTPATIENT SERVICES | Facility: HOSPITAL | Age: 63
LOS: 1 days | End: 2023-02-22
Payer: COMMERCIAL

## 2023-02-22 VITALS
DIASTOLIC BLOOD PRESSURE: 86 MMHG | TEMPERATURE: 97 F | RESPIRATION RATE: 16 BRPM | HEART RATE: 89 BPM | HEIGHT: 71.5 IN | OXYGEN SATURATION: 97 % | SYSTOLIC BLOOD PRESSURE: 131 MMHG | WEIGHT: 171.96 LBS

## 2023-02-22 DIAGNOSIS — Z98.890 OTHER SPECIFIED POSTPROCEDURAL STATES: Chronic | ICD-10-CM

## 2023-02-22 DIAGNOSIS — D03.39 MELANOMA IN SITU OF OTHER PARTS OF FACE: ICD-10-CM

## 2023-02-22 DIAGNOSIS — Z01.812 ENCOUNTER FOR PREPROCEDURAL LABORATORY EXAMINATION: ICD-10-CM

## 2023-02-22 DIAGNOSIS — R20.0 ANESTHESIA OF SKIN: ICD-10-CM

## 2023-02-22 LAB
ALBUMIN SERPL ELPH-MCNC: 4.5 G/DL — SIGNIFICANT CHANGE UP (ref 3.3–5)
ALP SERPL-CCNC: 69 U/L — SIGNIFICANT CHANGE UP (ref 40–120)
ALT FLD-CCNC: 18 U/L — SIGNIFICANT CHANGE UP (ref 4–41)
ANION GAP SERPL CALC-SCNC: 12 MMOL/L — SIGNIFICANT CHANGE UP (ref 7–14)
AST SERPL-CCNC: 16 U/L — SIGNIFICANT CHANGE UP (ref 4–40)
BILIRUB SERPL-MCNC: 0.4 MG/DL — SIGNIFICANT CHANGE UP (ref 0.2–1.2)
BUN SERPL-MCNC: 9 MG/DL — SIGNIFICANT CHANGE UP (ref 7–23)
CALCIUM SERPL-MCNC: 9.6 MG/DL — SIGNIFICANT CHANGE UP (ref 8.4–10.5)
CHLORIDE SERPL-SCNC: 106 MMOL/L — SIGNIFICANT CHANGE UP (ref 98–107)
CO2 SERPL-SCNC: 25 MMOL/L — SIGNIFICANT CHANGE UP (ref 22–31)
CREAT SERPL-MCNC: 0.96 MG/DL — SIGNIFICANT CHANGE UP (ref 0.5–1.3)
EGFR: 90 ML/MIN/1.73M2 — SIGNIFICANT CHANGE UP
GLUCOSE SERPL-MCNC: 87 MG/DL — SIGNIFICANT CHANGE UP (ref 70–99)
HCT VFR BLD CALC: 44.5 % — SIGNIFICANT CHANGE UP (ref 39–50)
HGB BLD-MCNC: 14.6 G/DL — SIGNIFICANT CHANGE UP (ref 13–17)
MCHC RBC-ENTMCNC: 29.1 PG — SIGNIFICANT CHANGE UP (ref 27–34)
MCHC RBC-ENTMCNC: 32.8 GM/DL — SIGNIFICANT CHANGE UP (ref 32–36)
MCV RBC AUTO: 88.6 FL — SIGNIFICANT CHANGE UP (ref 80–100)
NRBC # BLD: 0 /100 WBCS — SIGNIFICANT CHANGE UP (ref 0–0)
NRBC # FLD: 0 K/UL — SIGNIFICANT CHANGE UP (ref 0–0)
PLATELET # BLD AUTO: 227 K/UL — SIGNIFICANT CHANGE UP (ref 150–400)
POTASSIUM SERPL-MCNC: 3.8 MMOL/L — SIGNIFICANT CHANGE UP (ref 3.5–5.3)
POTASSIUM SERPL-SCNC: 3.8 MMOL/L — SIGNIFICANT CHANGE UP (ref 3.5–5.3)
PROT SERPL-MCNC: 6.8 G/DL — SIGNIFICANT CHANGE UP (ref 6–8.3)
RBC # BLD: 5.02 M/UL — SIGNIFICANT CHANGE UP (ref 4.2–5.8)
RBC # FLD: 12.5 % — SIGNIFICANT CHANGE UP (ref 10.3–14.5)
SODIUM SERPL-SCNC: 143 MMOL/L — SIGNIFICANT CHANGE UP (ref 135–145)
SURGICAL PATHOLOGY STUDY: SIGNIFICANT CHANGE UP
WBC # BLD: 8.8 K/UL — SIGNIFICANT CHANGE UP (ref 3.8–10.5)
WBC # FLD AUTO: 8.8 K/UL — SIGNIFICANT CHANGE UP (ref 3.8–10.5)

## 2023-02-22 PROCEDURE — 93010 ELECTROCARDIOGRAM REPORT: CPT

## 2023-02-22 RX ORDER — LUBIPROSTONE 24 UG/1
1 CAPSULE, GELATIN COATED ORAL
Qty: 0 | Refills: 0 | DISCHARGE

## 2023-02-22 RX ORDER — ACETAMINOPHEN 500 MG
2 TABLET ORAL
Qty: 0 | Refills: 0 | DISCHARGE

## 2023-02-22 NOTE — H&P PST ADULT - ATTENDING COMMENTS
63 y.o. man w/ m.i.s. of his left nasal ala, sched'd for w.e. and reconst. (Dr Pepe)    D/W him pre-op and today, all questions answered.    Dx, operative approach, R/B/O, and possible surg. outcomes reviewed, all questions answered, consent on chart

## 2023-02-22 NOTE — H&P PST ADULT - PROBLEM SELECTOR PLAN 1
Scheduled for wide excision melanoma left side of nose.  Written & verbal preop instructions, gi prophylaxis.  Pt verbalized good understanding.

## 2023-02-22 NOTE — H&P PST ADULT - NSICDXPASTMEDICALHX_GEN_ALL_CORE_FT
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

## 2023-02-22 NOTE — H&P PST ADULT - NSICDXPASTSURGICALHX_GEN_ALL_CORE_FT
PAST SURGICAL HISTORY:  H/O hernia repair     H/O inguinal hernia repair     H/O laminectomy L5-S1 x2  6/2015, 10/2015    H/O melanoma excision of nose 10/2016    S/P small bowel resection

## 2023-02-22 NOTE — H&P PST ADULT - ANESTHESIA, PREVIOUS REACTION, PROFILE
denies please obtain records from 2016 & 2019 [facility], states need to be administered "a certain way", denies

## 2023-02-22 NOTE — H&P PST ADULT - PROBLEM SELECTOR PLAN 3
Pt states "was told in past by orthopedic surgeon anesthesia had to be administered a certain way"  Pt had surgery in 2016 & 2019 at [facility] and is requesting same.  Pending anesthesia records

## 2023-02-22 NOTE — H&P PST ADULT - MUSCULOSKELETAL COMMENTS
intermittent low back pain, occasionally associated with left posterior muscle spasms, takes tylenol prn with sufficient pain relief intermittent low back pain

## 2023-02-22 NOTE — H&P PST ADULT - HISTORY OF PRESENT ILLNESS
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  Dr Pepe to add codes.  Pt states melanoma dx on recent dermatology visit.

## 2023-02-23 ENCOUNTER — APPOINTMENT (OUTPATIENT)
Dept: PLASTIC SURGERY | Facility: CLINIC | Age: 63
End: 2023-02-23
Payer: COMMERCIAL

## 2023-02-23 VITALS
TEMPERATURE: 97.9 F | OXYGEN SATURATION: 97 % | WEIGHT: 170 LBS | HEART RATE: 92 BPM | DIASTOLIC BLOOD PRESSURE: 84 MMHG | BODY MASS INDEX: 22.53 KG/M2 | HEIGHT: 73 IN | SYSTOLIC BLOOD PRESSURE: 126 MMHG

## 2023-02-23 PROCEDURE — 99204 OFFICE O/P NEW MOD 45 MIN: CPT

## 2023-02-23 NOTE — CONSULT LETTER
[Dear  ___] : Dear  [unfilled], [Consult Letter:] : I had the pleasure of evaluating your patient, [unfilled]. [Please see my note below.] : Please see my note below. [Consult Closing:] : Thank you very much for allowing me to participate in the care of this patient.  If you have any questions, please do not hesitate to contact me. [Sincerely,] : Sincerely, [FreeTextEntry3] : Leighton Pepe MD, FACS

## 2023-02-24 PROBLEM — D03.39 MELANOMA IN SITU OF OTHER PARTS OF FACE: Chronic | Status: ACTIVE | Noted: 2023-02-22

## 2023-02-27 ENCOUNTER — APPOINTMENT (OUTPATIENT)
Dept: INTERNAL MEDICINE | Facility: CLINIC | Age: 63
End: 2023-02-27
Payer: COMMERCIAL

## 2023-02-27 VITALS
OXYGEN SATURATION: 99 % | HEART RATE: 81 BPM | DIASTOLIC BLOOD PRESSURE: 82 MMHG | WEIGHT: 168 LBS | BODY MASS INDEX: 22.26 KG/M2 | HEIGHT: 73 IN | SYSTOLIC BLOOD PRESSURE: 112 MMHG

## 2023-02-27 PROCEDURE — 99214 OFFICE O/P EST MOD 30 MIN: CPT

## 2023-02-28 NOTE — RESULTS/DATA
[] : results reviewed [de-identified] : wnl [de-identified] : wnl  [de-identified] : EKG NSR, no acute ST/T changes, normal tracing

## 2023-02-28 NOTE — PHYSICAL EXAM
[No Acute Distress] : no acute distress [Well Nourished] : well nourished [Well Developed] : well developed [Well-Appearing] : well-appearing [No JVD] : no jugular venous distention [Supple] : supple [No Respiratory Distress] : no respiratory distress  [No Accessory Muscle Use] : no accessory muscle use [Clear to Auscultation] : lungs were clear to auscultation bilaterally [Normal Percussion] : the chest was normal to percussion [Normal Rate] : normal rate  [Regular Rhythm] : with a regular rhythm [Normal S1, S2] : normal S1 and S2 [No Murmur] : no murmur heard [No Carotid Bruits] : no carotid bruits [No Abdominal Bruit] : a ~M bruit was not heard ~T in the abdomen [Pedal Pulses Present] : the pedal pulses are present [No Edema] : there was no peripheral edema [No Extremity Clubbing/Cyanosis] : no extremity clubbing/cyanosis [Soft] : abdomen soft [Non Tender] : non-tender [Non-distended] : non-distended [No HSM] : no HSM [Normal Bowel Sounds] : normal bowel sounds

## 2023-02-28 NOTE — HISTORY OF PRESENT ILLNESS
[No Pertinent Cardiac History] : no history of aortic stenosis, atrial fibrillation, coronary artery disease, recent myocardial infarction, or implantable device/pacemaker [No Pertinent Pulmonary History] : no history of asthma, COPD, sleep apnea, or smoking [No Adverse Anesthesia Reaction] : no adverse anesthesia reaction in self or family member [Chronic Anticoagulation] : no chronic anticoagulation [Chronic Kidney Disease] : no chronic kidney disease [Diabetes] : no diabetes [(Patient denies any chest pain, claudication, dyspnea on exertion, orthopnea, palpitations or syncope)] : Patient denies any chest pain, claudication, dyspnea on exertion, orthopnea, palpitations or syncope [Moderate (4-6 METs)] : Moderate (4-6 METs) [FreeTextEntry1] : melanoma in situ/further nasal resection of same [FreeTextEntry3] : Melinda/Moon [FreeTextEntry4] : Here for pre op evaluation for above.  Has been without changes in health relative to our last physicals in 2020 and prior.  Tolerates all his activity, working as .  Has no card/pulm issues on review.  Has dyslipidemia on review of his chart.  Has tolerated laminectomy and in situ first excision in past w/o issue.  He also has hx of Crohn's remotely, s/p ileal resection w no activity since.

## 2023-03-06 ENCOUNTER — TRANSCRIPTION ENCOUNTER (OUTPATIENT)
Age: 63
End: 2023-03-06

## 2023-03-06 NOTE — ASU PATIENT PROFILE, ADULT - FALL HARM RISK - UNIVERSAL INTERVENTIONS
Bed in lowest position, wheels locked, appropriate side rails in place/Call bell, personal items and telephone in reach/Instruct patient to call for assistance before getting out of bed or chair/Non-slip footwear when patient is out of bed/[place] to call system/Physically safe environment - no spills, clutter or unnecessary equipment/Purposeful Proactive Rounding/Room/bathroom lighting operational, light cord in reach

## 2023-03-06 NOTE — ASU PATIENT PROFILE, ADULT - ABILITY TO HEAR (WITH HEARING AID OR HEARING APPLIANCE IF NORMALLY USED):
Adequate: hears normal conversation without difficulty pt left bilateral hearing aids at home/Mildly to Moderately Impaired: difficulty hearing in some environments or speaker may need to increase volume or speak distinctly

## 2023-03-06 NOTE — ASU PATIENT PROFILE, ADULT - NSALCOHOLTYPE_GEN__A_CORE_SD
PT A/OX3 ASSESSMENT AND VS COMPLETED. PT LUNG SOUNDS TO RIGHT LUNG CRACKLES.
PT IV SITES NOTED. S.L. PT DENIES ADDITIONAL NEEDS AT THE TIME ALL SAFETY
PRECAUTIONS IN PLACE WITH CALL LIGHT INREACH. wine

## 2023-03-07 ENCOUNTER — RESULT REVIEW (OUTPATIENT)
Age: 63
End: 2023-03-07

## 2023-03-07 ENCOUNTER — TRANSCRIPTION ENCOUNTER (OUTPATIENT)
Age: 63
End: 2023-03-07

## 2023-03-07 ENCOUNTER — APPOINTMENT (OUTPATIENT)
Dept: SURGICAL ONCOLOGY | Facility: HOSPITAL | Age: 63
End: 2023-03-07

## 2023-03-07 ENCOUNTER — APPOINTMENT (OUTPATIENT)
Dept: PLASTIC SURGERY | Facility: HOSPITAL | Age: 63
End: 2023-03-07

## 2023-03-07 ENCOUNTER — OUTPATIENT (OUTPATIENT)
Dept: OUTPATIENT SERVICES | Facility: HOSPITAL | Age: 63
LOS: 1 days | Discharge: HOME | End: 2023-03-07
Payer: COMMERCIAL

## 2023-03-07 VITALS
SYSTOLIC BLOOD PRESSURE: 126 MMHG | TEMPERATURE: 99 F | DIASTOLIC BLOOD PRESSURE: 91 MMHG | HEART RATE: 91 BPM | WEIGHT: 171.96 LBS | OXYGEN SATURATION: 98 % | HEIGHT: 71.5 IN | RESPIRATION RATE: 16 BRPM

## 2023-03-07 VITALS
HEART RATE: 78 BPM | RESPIRATION RATE: 16 BRPM | DIASTOLIC BLOOD PRESSURE: 61 MMHG | TEMPERATURE: 98 F | SYSTOLIC BLOOD PRESSURE: 110 MMHG | OXYGEN SATURATION: 96 %

## 2023-03-07 DIAGNOSIS — Z98.890 OTHER SPECIFIED POSTPROCEDURAL STATES: Chronic | ICD-10-CM

## 2023-03-07 DIAGNOSIS — D03.39 MELANOMA IN SITU OF OTHER PARTS OF FACE: ICD-10-CM

## 2023-03-07 PROCEDURE — 88305 TISSUE EXAM BY PATHOLOGIST: CPT | Mod: 26

## 2023-03-07 PROCEDURE — 11643 EXC F/E/E/N/L MAL+MRG 2.1-3: CPT

## 2023-03-07 PROCEDURE — 15240 FTH/GFT F/C/C/M/N/AX/G/H/F20: CPT

## 2023-03-07 RX ORDER — CEPHALEXIN 500 MG
1 CAPSULE ORAL
Qty: 20 | Refills: 0
Start: 2023-03-07 | End: 2023-03-11

## 2023-03-07 NOTE — BRIEF OPERATIVE NOTE - NSICDXBRIEFPROCEDURE_GEN_ALL_CORE_FT
PROCEDURES:  Excision, lesion, malignant, face, ear, eyelid, nose, or lip, 1.1 cm to 2.0 cm in diameter 07-Mar-2023 10:34:17  Shawn Carvajal  Application of full thickness skin graft of nose, eyelid, or lip, 20 sq cm or less 07-Mar-2023 10:34:31  Shawn Carvajal

## 2023-03-07 NOTE — BRIEF OPERATIVE NOTE - OPERATION/FINDINGS
w.e. left nasal ala m.i.s., w/ reconst w.e. left nasal ala m.i.s., w/ reconst with full thickness skin graft from the left neck

## 2023-03-07 NOTE — ASU DISCHARGE PLAN (ADULT/PEDIATRIC) - ASU DC SPECIAL INSTRUCTIONSFT
Initial followup with plastic surgery in the next 5-15 days, regarding matters of bandages, activities, and showering.    Dr. Lawrence should call with pathology report in approximately 2 weeks.  The conversation will determine further management. You have a bolster on your nose. Do not remove it. Avoid disturbing it. Dr. Pepe will remove it in the office. Do not get it wet.     Please sponge bathe or wash with a wash cloth only until your first follow-up appointment.    Please keep your head elevated while in bed. You can place several pillows under your back to keep your head up.    Take medications as prescribed from the office.    Please follow up with Dr. Pepe within x1 week after discharge from the hospital. You may call [phone] to schedule an appointment.     Dr. Lawrence should call with pathology report in approximately 2 weeks.  The conversation will determine further management.

## 2023-03-07 NOTE — ASU DISCHARGE PLAN (ADULT/PEDIATRIC) - NS MD DC FALL RISK RISK
For information on Fall & Injury Prevention, visit: [email or web address] OR  [email or web address] OR  https://www.cdc.gov/steadi/patient.html

## 2023-03-07 NOTE — ASU DISCHARGE PLAN (ADULT/PEDIATRIC) - PROCEDURE
excision of melanoma from left side of nose, with reconstuction excision of melanoma from left side of nose, with reconstruction using full thickness skin graft from the left neck

## 2023-03-07 NOTE — ASU DISCHARGE PLAN (ADULT/PEDIATRIC) - CARE PROVIDER_API CALL
Leighton Pepe (MD)  ColonRectal Surgery; Plastic Surgery; Surgery; Surgery of the Hand  [address], [address]  [place] [address]  Phone: [phone]  Fax: [phone]  Follow Up Time:     Neftaly Lawrence)  Surgery  [address] [place] [id number]  Phone: [phone]  Fax: [phone]  Follow Up Time:

## 2023-03-07 NOTE — ASU DISCHARGE PLAN (ADULT/PEDIATRIC) - NURSING INSTRUCTIONS
TYLENOL for pain management may be taken if needed for pain. DO NOT take any additional products containing TYLENOL or ACETAMINOPHEN, such as VICODIN, PERCOCET, NORCO, EXCEDRIN, and any over-the-counter cold medications. DO NOT CONSUME MORE THAN 5592-3285 MG OF TYLENOL (acetaminophen) in a 24-hour period.

## 2023-03-07 NOTE — ASU DISCHARGE PLAN (ADULT/PEDIATRIC) - NS DC INTERPRETER YES NO
[facility]. Pulmonary, Critical Care, and Sleep Medicine    Initial Patient Consult    Name: Hina Koch MRN: [id number]   : 1957 Hospital: [facility]   Date: 2020        This patient has been seen and evaluated at the request of Dr. Yulia Phillips for COPD/Asthma exacerbation. IMPRESSION:   · Acute exacerbation of asthma/COPD: Patient has had multiple exacerbations in the last few months requiring corticosteroids  · Underlying poorly controlled severe persistent asthma with steroid dependence:  Pt has strong asthma component given significant bronchospasm to a wide variety of classic environmental triggers (I.e. fumes, fragrances, chemicals, smoke, mold, etc). Pt was on SCIT therapy many decades ago. · Chronic versus acute on chronic hypoxic and hypercapnic respiratory failure: pt on 2 L by nasal cannula chronically and on trilogy nightly  · Lactic acidosis: Patient had elevated lactic acid of 2.9 during her ER visit on 2020. Lactic acid increased to 5.6 this morning. Unclear source of tissue hypoperfusion versus lab aberrancy given that patient is normotensive without any vasopressor use. Patient has normal serum bicarb on metabolic panel as well as ABG. · Morbid obesity: Body mass index is 45.6 kg/m².    · SHERRIE/OHV: On trilogy (AVAPS-AE) as noted above  · Diabetes  · Hypertension  · Chronic steroid use: Patient on 10 mg every other day chronic prednisone     Patient Active Problem List   Diagnosis Code    Essential hypertension, benign I10    Diabetes mellitus, type 2 ([facility] E11.9    Esophageal reflux K21.9    SHERRIE on CPAP G47.33, Z99.89    COPD (chronic obstructive pulmonary disease) ([facility]) J44.9    Allergic rhinitis J30.9    COPD with acute exacerbation ([facility]) J44.1    Obesity, Class III, BMI 40-49.9 (morbid obesity) ([facility]) E66.01    Chest pain R07.9    Dyspnea R06.00    COPD exacerbation ([facility]) J44.1    Acute exacerbation of COPD with asthma ([facility] J44.1, E88.676    Diastolic CHF, chronic ([facility]) I50.32    Diverticulosis K57.90    COPD with acute bronchitis ([facility]) J44.0, J20.9    Hyperlipidemia E78.5    Type 2 diabetes with nephropathy ([facility]) E11.21    Bilateral carotid bruits R09.89    Palpitations R00.2    Acute exacerbation of chronic obstructive pulmonary disease (COPD) ([facility]) J44.1    Atelectasis of right lung J98.11      RECOMMENDATIONS:   IV steroids -40 mg every 8 hours. Wean as tolerated. Given severe asthma component, with multiple exacerbations recently, would advise an extended taper over 6 to 8 weeks. Given underlying diabetes, patient will need intensive management of blood sugars to avoid hyperglycemia complications  Schedule bronchodilators: duonebs q4h, pulmicort nebs 1mg BID, and albuterol PRN  Please hold home bronchodilators (Advair HFA and Spiriva). Please resume on discharge -- in addition, please add dual ICS therapy with Flovent HFA 200mcg 2 puffs BID (or ICS formulary alternative)  Agree with ABx - Z-milka x 5 days, no PNA on CXR  Trend lactate, advise blood cx, UA to complete sepsis workup  Supplemental oxygen to maintain SpO2 >88%, wean as tolerated  Please assess for home oxygen need prior to discharge  Bipap-ST QHS and PRN. Pt was on continuous, discontinued this afternoon, tolerating 2LNC well. Aggressive pulmonary toileting/bronchial hygiene  Frequent incentive spirometry  Aspiration precautions including elevating HOB >30deg  PT/OT, OOB, ambulate with assistance as tolerated  DVT ppx per primary service  As an outpatient, pt will likely need anti IL-5R or anti IL-4/13 therapy given hx of multiple exacerbations AND oral steroid dependence -- discussed with pt at length, including risks and benefits, pt agreeable.   As an outpatient, patient may benefit from allergy referral for SCIT therapy  Will follow     Subjective:     Patient is a 63 y.o. female with a past medical history of COPD/asthma overlap, SHERRIE/OHV on AVAPS via trilogy, morbid obesity, diabetes, hypertension, [facility] with acute onset of shortness of breath. Patient reports that symptoms started 2 days prior. Patient reports dyspnea was associated with minimal exertion and at rest, no alleviating factors including PRN albuterol via nebulizer or HFA. Patient reports that she was having frequent nocturnal awakenings despite using her trilogy nightly. Patient reports she normally sleeps well with trilogy machine. Patient reports that symptoms were present a week prior, patient was seen in the ER, and discharged on prednisone 50 mg x 3 days, however patient worsened after steroids were discontinued. Patient reports she is had multiple exacerbations over the last 2 months, symptoms worsened at the end of February, patient has been intermittently on burst of steroids at least 4 times in the last 2 months. Patient reports she is compliant with her Advair HFA 2 puffs twice daily and Spiriva once daily. Patient reports she quit smoking in 2007. Patient reports that she was a prior 1 pack/day smoker. 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 Patient reports that she has had some dyspnea relief after being given bronchodilators, patient was placed on continuous BiPAP which has worked well. Per nursing, patient was having pressure erythema over her face with continuous BiPAP. Patient reports having significant dyspnea with exposure to multiple triggers including fragrances, smells, pollen, dust, mold. Patient reported that she had to stay with her daughter and had to sit outside while Lysol  was used, due to excessive dyspnea. Patient reports she has been on chronic prednisone 10 mg every other day for over a year now. Patient reports a remote history of asthma and allergies, used to see Dr. Jeanie Rowe, but reports she has not been seen for a few decades now, was on SCIT therapy at that time.   Patient denies fevers, chills, night sweats, nausea, vomiting, diarrhea, hemoptysis, voice hoarseness. Past Medical History:   Diagnosis Date    Asthma     Chronic lung disease     COPD     Cystocele, midline     Diabetes mellitus ([address]     GERD (gastroesophageal reflux disease)     Hidradenitis suppurativa     Hyperlipidemia     Hypertension     SHERRIE on CPAP     CPAP    Stress incontinence       Past Surgical History:   Procedure Laterality Date    BREAST SURGERY PROCEDURE UNLISTED      Right breast benign tumor removal    HX APPENDECTOMY      HX CHOLECYSTECTOMY      HX DILATION AND CURETTAGE      Dysfunctional uterine bleeding, thought 2/2 fibroids    HX TUBAL LIGATION        Prior to Admission medications    Medication Sig Start Date End Date Taking? Authorizing Provider   furosemide (Lasix) 20 mg tablet Take 20 mg by mouth every other day. Yes Provider, Historical   insulin glargine (LANTUS,BASAGLAR) 100 unit/mL (3 mL) inpn 40 Units by SubCUTAneous route nightly. Please inject 40 units every bedtime. Decrease to 35 units every bedtime after 7 days of 40 units. Indications: type 2 diabetes mellitus 4/2/20  Yes Flip Mike MD   simethicone (GAS-X) 125 mg capsule Take 125 mg by mouth four (4) times daily as needed for Flatulence. Yes Provider, Historical   loratadine (CLARITIN) 10 mg tablet Take 10 mg by mouth daily as needed for Allergies. Yes Provider, Historical   lisinopril (PRINIVIL, ZESTRIL) 20 mg tablet Take 20 mg by mouth two (2) times a day. Yes Provider, Historical   albuterol sulfate (PROVENTIL;VENTOLIN) 2.5 mg/0.5 mL nebu nebulizer solution 0.5 mL by Nebulization route four (4) times daily as needed for Wheezing. To be used with HyperSal nebulizer solution. ICD code: COPD J44.1 7/2/19  Yes Larry Art MD   fluticasone propionate [facility] ALLERGY RELIEF) 50 mcg/actuation nasal spray 2 Sprays by Both Nostrils route daily.    Yes Provider, Historical   fluticasone propion-salmeterol (ADVAIR HFA) 438-93 mcg/actuation inhaler Take 2 Puffs by inhalation two (2) times a day. Yes Provider, Historical   hydroCHLOROthiazide (HYDRODIURIL) 12.5 mg tablet Take 12.5 mg by mouth daily. Yes Provider, Historical   tiotropium bromide (SPIRIVA RESPIMAT) 2.5 mcg/actuation inhaler Take 2 Puffs by inhalation daily. Yes Provider, Historical   NOVOLOG FLEXPEN U-100 INSULIN 100 unit/mL inpn Continue home Sliding scale insulin as prior to admission  Patient taking differently: 1 Units by SubCUTAneous route three (3) times daily. If BG <100=0u  101-150=5u  151-250=8u  251-300=12u  >300 call MD   7/10/18  Yes Maynor Goodman MD   OXYGEN-AIR DELIVERY SYSTEMS 2 L by Nasal route continuous. [facility] Choice   Yes Provider, Historical   aspirin delayed-release 81 mg tablet Take 81 mg by mouth daily. Yes Provider, Historical   montelukast (SINGULAIR) 10 mg tablet Take 10 mg by mouth nightly. Yes Other, MD Lauren   azithromycin (ZITHROMAX) 250 mg tablet Take 1 Tab by mouth daily. Monday-Friday. 4/2/20   Letty Benitez MD   lactobacillus sp. 50 billion cpu (BIO-K PLUS) 50 billion cell -375 mg cap capsule Take 1 Cap by mouth daily. 3/10/20   Letty Benitez MD   albuterol sulfate 90 mcg/actuation aepb Take 1 Puff by inhalation every four (4) hours. Patient taking differently: Take 1 Puff by inhalation every four (4) hours as needed. 8/25/18   Tori Rivas MD     Allergies   Allergen Reactions    Ancef [Cefazolin] Hives    Contrast Agent [Iodine] Anaphylaxis, Shortness of Breath and Swelling     Needs pre-medication for IV contrast with Benadryl, Solu-Medrol    Fish Containing Products Anaphylaxis     Pt states she had a severe allergic reaction at 10 y/o.  Statins-Hmg-Coa Reductase Inhibitors Myalgia    Metformin Other (comments)     Abdominal pain, diarrhea.     Codeine Other (comments)     Altered mental status      Social History     Tobacco Use    Smoking status: Former Smoker     Packs/day: 1.00     Years: 30.00     Pack years: 30.00     Types: Cigarettes     Start date: 1966     Last attempt to quit: 2006     Years since quittin.7    Smokeless tobacco: Former User    Tobacco comment: 1-1.5 packs per day   Substance Use Topics    Alcohol use: No      Family History   Problem Relation Age of Onset    Hypertension Mother     Stroke Mother     Breast Cancer Mother         Bilateral mastectomies    Cancer Mother         ovarian and breast    Heart Failure Mother     Heart Attack Father         2011    Heart Surgery Father         CABG    Heart Failure Father     COPD Sister         Heavy smoker    Cancer Sister         ovarian    Heart Failure Sister     Lung Disease Sister     Asthma Child     Cancer Maternal Aunt         pancreatic    Cancer Maternal Grandfather         stomach        Current Facility-Administered Medications   Medication Dose Route Frequency    aspirin delayed-release tablet 81 mg  81 mg Oral DAILY    budesonide (PULMICORT) 1,000 mcg/2 mL nebulizer susp  1,000 mcg Nebulization BID RT    fluticasone propionate (FLONASE) 50 mcg/actuation nasal spray 2 Spray  2 Spray Both Nostrils DAILY    lisinopriL (PRINIVIL, ZESTRIL) tablet 20 mg  20 mg Oral BID    montelukast (SINGULAIR) tablet 10 mg  10 mg Oral QHS    heparin (porcine) injection 5,000 Units  5,000 Units SubCUTAneous Q8H    insulin lispro (HUMALOG) injection   SubCUTAneous AC&HS    insulin glargine (LANTUS) injection 35 Units  35 Units SubCUTAneous QPM    hydroCHLOROthiazide (MICROZIDE) capsule 12.5 mg  12.5 mg Oral DAILY    pantoprazole (PROTONIX) tablet 40 mg  40 mg Oral DAILY    furosemide (LASIX) tablet 20 mg  20 mg Oral DAILY    albuterol-ipratropium (DUO-NEB) 2.5 MG-0.5 MG/3 ML  3 mL Nebulization Q4H RT    methylPREDNISolone (PF) (SOLU-MEDROL) injection 40 mg  40 mg IntraVENous Q8H    0.9% sodium chloride infusion  75 mL/hr IntraVENous CONTINUOUS    azithromycin (ZITHROMAX) tablet 250 mg  250 mg Oral Once per day on        Review of Systems:  A comprehensive ROS was obtained as stated in HPI, all others are negative      Objective:   Vital Signs:    Visit Vitals  /68   Pulse 79   Temp 97.7 °F (36.5 °C)   Resp 21   Ht 5' 3\" (1.6 m)   Wt 116.8 kg (257 lb 6.4 oz)   SpO2 96%   Breastfeeding No   BMI 45.60 kg/m²       O2 Device: Room air   O2 Flow Rate (L/min): 2 l/min   Temp (24hrs), Av.8 °F (36.6 °C), Min:97.4 °F (36.3 °C), Max:98.4 °F (36.9 °C)       Intake/Output:   Last shift:      No intake/output data recorded. Last 3 shifts:  07 -  190  In: 290 [P.O.:240; I.V.:50]  Out: 1700 [Urine:1700]    Intake/Output Summary (Last 24 hours) at 2020 192  Last data filed at 2020 1856  Gross per 24 hour   Intake 290 ml   Output 1700 ml   Net -1410 ml      Physical Exam:   General:  Alert, cooperative, no distress, appears stated age, obese, comfortable, taken off of BiPAP and placed on nasal cannula, comfortable, able to talk in full sentences. Head:  Normocephalic, without obvious abnormality, atraumatic. Eyes:  Conjunctivae/corneas clear. ANicteric, PERRLA, EOMI   Nose: Nares normal. Mucosa normal. No drainage or sinus tenderness. Throat: Lips, mucosa dry. NO thrush;, poor dentition, no oral lesions, Mallampati IV   Neck: Supple, symmetrical, trachea midline, no adenopathy, thyroid: no enlargment/tenderness/nodules, no carotid bruit and no JVD. No crepitus   Back:   Symmetric, no curvature, no spine tenderness or flank pain   Lungs:    Poor air entry in bilateral bases, CTA BL, no wheezes/rales/rhonchi throughout all lung fields   Chest wall:  No tenderness or deformity. NO CREPITUS   Heart:  Regular rate and rhythm, S1, S2 normal, no murmur, click, rub or gallop. Abdomen:   Soft, obese, non-tender. Bowel sounds normal. No masses,  No organomegaly. No paradoxical motion   Extremities: normal, atraumatic, no cyanosis or edema.   No clubbing   Pulses: 1-2+ and symmetric all extremities. Skin: Skin color, texture, turgor normal. No rashes or lesions   Lymph nodes: Cervical, supraclavicular, and axillary nodes normal.   Neurologic: Grossly nonfocal, strength and coordination and sensation grossly intact throughout all extremities, AO x3          Data review:   Labs:  Recent Results (from the past 24 hour(s))   CBC WITH AUTOMATED DIFF    Collection Time: 05/21/20 10:30 PM   Result Value Ref Range    WBC 8.3 4.6 - 13.2 K/uL    RBC 4.22 4.20 - 5.30 M/uL    HGB 12.8 12.0 - 16.0 g/dL    HCT 37.7 35.0 - 45.0 %    MCV 89.3 74.0 - 97.0 FL    MCH 30.3 24.0 - 34.0 PG    MCHC 34.0 31.0 - 37.0 g/dL    RDW 14.3 11.6 - 14.5 %    PLATELET 120 400 - 019 K/uL    MPV 9.1 (L) 9.2 - 11.8 FL    NEUTROPHILS 86 (H) 40 - 73 %    LYMPHOCYTES 11 (L) 21 - 52 %    MONOCYTES 3 3 - 10 %    EOSINOPHILS 0 0 - 5 %    BASOPHILS 0 0 - 2 %    ABS. NEUTROPHILS 7.2 1.8 - 8.0 K/UL    ABS. LYMPHOCYTES 0.9 0.9 - 3.6 K/UL    ABS. MONOCYTES 0.2 0.05 - 1.2 K/UL    ABS. EOSINOPHILS 0.0 0.0 - 0.4 K/UL    ABS. BASOPHILS 0.0 0.0 - 0.1 K/UL    DF AUTOMATED     METABOLIC PANEL, COMPREHENSIVE    Collection Time: 05/21/20 10:30 PM   Result Value Ref Range    Sodium 134 (L) 136 - 145 mmol/L    Potassium 4.6 3.5 - 5.5 mmol/L    Chloride 105 100 - 111 mmol/L    CO2 26 21 - 32 mmol/L    Anion gap 3 3.0 - 18 mmol/L    Glucose 310 (H) 74 - 99 mg/dL    BUN 18 7.0 - 18 MG/DL    Creatinine 1.09 0.6 - 1.3 MG/DL    BUN/Creatinine ratio 17 12 - 20      GFR est AA >60 >60 ml/min/1.73m2    GFR est non-AA 51 (L) >60 ml/min/1.73m2    Calcium 9.0 8.5 - 10.1 MG/DL    Bilirubin, total 0.6 0.2 - 1.0 MG/DL    ALT (SGPT) 50 13 - 56 U/L    AST (SGOT) 30 10 - 38 U/L    Alk.  phosphatase 84 45 - 117 U/L    Protein, total 7.2 6.4 - 8.2 g/dL    Albumin 3.8 3.4 - 5.0 g/dL    Globulin 3.4 2.0 - 4.0 g/dL    A-G Ratio 1.1 0.8 - 1.7     MAGNESIUM    Collection Time: 05/21/20 10:30 PM   Result Value Ref Range    Magnesium 1.8 1.6 - 2.6 mg/dL   CARDIAC PANEL,(CK, CKMB & TROPONIN)    Collection Time: 05/21/20 10:30 PM   Result Value Ref Range    CK - MB <1.0 <3.6 ng/ml    CK-MB Index  0.0 - 4.0 %     CALCULATION NOT PERFORMED WHEN RESULT IS BELOW LINEAR LIMIT    CK 81 26 - 192 U/L    Troponin-I, QT <0.02 0.0 - 0.045 NG/ML   EKG, 12 LEAD, INITIAL    Collection Time: 05/21/20 10:41 PM   Result Value Ref Range    Ventricular Rate 87 BPM    Atrial Rate 87 BPM    P-R Interval 148 ms    QRS Duration 80 ms    Q-T Interval 376 ms    QTC Calculation (Bezet) 452 ms    Calculated P Axis 61 degrees    Calculated R Axis -4 degrees    Calculated T Axis 52 degrees    Diagnosis       Normal sinus rhythm  Cannot rule out Anterior infarct , age undetermined  Abnormal ECG  When compared with ECG of 13-MAY-2020 18:17,  Minimal criteria for Anterior infarct are now present     POC G3    Collection Time: 05/21/20 11:00 PM   Result Value Ref Range    Device: BIPAP      FIO2 (POC) 40 %    pH (POC) 7.377 7.35 - 7.45      pCO2 (POC) 45.3 (H) 35.0 - 45.0 MMHG    pO2 (POC) 259 (H) 80 - 100 MMHG    HCO3 (POC) 26.6 (H) 22 - 26 MMOL/L    sO2 (POC) 100 (H) 92 - 97 %    Base excess (POC) 1 mmol/L    PEEP/CPAP (POC) 5 cmH2O    PIP (POC) 15      Pressure support 10 cmH2O    Allens test (POC) YES      Total resp.  rate 16      Site RIGHT RADIAL      Specimen type (POC) ARTERIAL      Performed by Philly Canada     Spontaneous timed YES     METABOLIC PANEL, BASIC    Collection Time: 05/22/20  5:56 AM   Result Value Ref Range    Sodium 134 (L) 136 - 145 mmol/L    Potassium 4.7 3.5 - 5.5 mmol/L    Chloride 102 100 - 111 mmol/L    CO2 24 21 - 32 mmol/L    Anion gap 8 3.0 - 18 mmol/L    Glucose 297 (H) 74 - 99 mg/dL    BUN 22 (H) 7.0 - 18 MG/DL    Creatinine 1.06 0.6 - 1.3 MG/DL    BUN/Creatinine ratio 21 (H) 12 - 20      GFR est AA >60 >60 ml/min/1.73m2    GFR est non-AA 53 (L) >60 ml/min/1.73m2    Calcium 9.6 8.5 - 10.1 MG/DL   CBC WITH AUTOMATED DIFF    Collection Time: 05/22/20  5:56 AM   Result Value Ref Range    WBC 8.6 4.6 - 13.2 K/uL    RBC 4.03 (L) 4.20 - 5.30 M/uL    HGB 12.2 12.0 - 16.0 g/dL    HCT 36.2 35.0 - 45.0 %    MCV 89.8 74.0 - 97.0 FL    MCH 30.3 24.0 - 34.0 PG    MCHC 33.7 31.0 - 37.0 g/dL    RDW 14.3 11.6 - 14.5 %    PLATELET 460 477 - 906 K/uL    MPV 9.2 9.2 - 11.8 FL    NEUTROPHILS 90 (H) 40 - 73 %    LYMPHOCYTES 9 (L) 21 - 52 %    MONOCYTES 1 (L) 3 - 10 %    EOSINOPHILS 0 0 - 5 %    BASOPHILS 0 0 - 2 %    ABS. NEUTROPHILS 7.7 1.8 - 8.0 K/UL    ABS. LYMPHOCYTES 0.8 (L) 0.9 - 3.6 K/UL    ABS. MONOCYTES 0.1 0.05 - 1.2 K/UL    ABS. EOSINOPHILS 0.0 0.0 - 0.4 K/UL    ABS.  BASOPHILS 0.0 0.0 - 0.1 K/UL    DF AUTOMATED     HEMOGLOBIN A1C WITH EAG    Collection Time: 05/22/20  5:56 AM   Result Value Ref Range    Hemoglobin A1c 8.4 (H) 4.2 - 5.6 %    Est. average glucose 194 mg/dL   GLUCOSE, POC    Collection Time: 05/22/20  9:20 AM   Result Value Ref Range    Glucose (POC) 269 (H) 70 - 110 mg/dL   LACTIC ACID    Collection Time: 05/22/20 11:13 AM   Result Value Ref Range    Lactic acid 5.6 (HH) 0.4 - 2.0 MMOL/L   GLUCOSE, POC    Collection Time: 05/22/20 11:16 AM   Result Value Ref Range    Glucose (POC) 256 (H) 70 - 110 mg/dL   GLUCOSE, POC    Collection Time: 05/22/20  3:58 PM   Result Value Ref Range    Glucose (POC) 225 (H) 70 - 110 mg/dL   LACTIC ACID    Collection Time: 05/22/20  4:08 PM   Result Value Ref Range    Lactic acid 4.0 (HH) 0.4 - 2.0 MMOL/L   NT-PRO BNP    Collection Time: 05/22/20  4:08 PM   Result Value Ref Range    NT pro-BNP 64 0 - 900 PG/ML     ABG:    Lab Results   Component Value Date/Time    PHI 7.377 05/21/2020 11:00 PM    PCO2I 45.3 (H) 05/21/2020 11:00 PM    PO2I 259 (H) 05/21/2020 11:00 PM    HCO3I 26.6 (H) 05/21/2020 11:00 PM    FIO2I 40 05/21/2020 11:00 PM       PFT Results  (Last 48 hours)    None        Echo Results  (Last 48 hours)    None        Imaging:  I have personally reviewed the patients radiographs and have reviewed the reports:  Chest x-ray from 5/21/2020 shows clear lung fields bilaterally, with right middle lobe atelectasis linear, no masses, consolidations, effusions seen. CT scan from 2/21/2018 reviewed personally shows scattered mild centrilobular emphysema mainly in upper lung fields, mild, along with right middle lobe and lower lobe linear atelectasis, mild, no masses, effusions, infiltrates seen  CXR Results  (Last 48 hours)               05/21/20 2251  XR CHEST PORT Final result    Impression:  Impression:        1. No acute finding. 2. Stable bibasilar streaky opacities, probably scarring or atelectasis. 3. Hyperinflation/emphysema. Narrative:  AP CHEST, PORTABLE       INDICATION: Chest pain, shortness of breath       COMPARISON: Prior chest x-rays, most recent 5/13/2020       FINDINGS:  EKG leads overlie the patient. Cardiac silhouette is stable. The pulmonary vasculature is unremarkable. Stable   bibasilar streaky opacities. No new consolidation, pleural effusion, or   pneumothorax. Lungs remain hyperinflated with relative lucency of the upper   lungs. No acute osseous abnormalities are identified. CT Results  (Last 48 hours)    None            High complexity decision making was performed during the evaluation of this patient at high risk for decompensation with multiple organ involvement     Above mentioned total time spent on reviewing the case/medical record/data/notes/EMR/patient examination/documentation/coordinating care with nurse/consultants, exclusive of procedures with complex decision making performed and > 50% time spent in face to face evaluation.            Sonal Monteiro MD/MPH     Pulmonary, Critical Care Medicine  [address] [place] [address] [facility]

## 2023-03-08 ENCOUNTER — NON-APPOINTMENT (OUTPATIENT)
Age: 63
End: 2023-03-08

## 2023-03-13 LAB — SURGICAL PATHOLOGY STUDY: SIGNIFICANT CHANGE UP

## 2023-03-14 ENCOUNTER — APPOINTMENT (OUTPATIENT)
Dept: PLASTIC SURGERY | Facility: CLINIC | Age: 63
End: 2023-03-14
Payer: COMMERCIAL

## 2023-03-14 VITALS
HEART RATE: 81 BPM | DIASTOLIC BLOOD PRESSURE: 81 MMHG | SYSTOLIC BLOOD PRESSURE: 117 MMHG | TEMPERATURE: 98 F | HEIGHT: 73 IN | BODY MASS INDEX: 22.26 KG/M2 | WEIGHT: 168 LBS | OXYGEN SATURATION: 98 %

## 2023-03-14 PROCEDURE — 99024 POSTOP FOLLOW-UP VISIT: CPT

## 2023-03-21 ENCOUNTER — APPOINTMENT (OUTPATIENT)
Dept: PLASTIC SURGERY | Facility: CLINIC | Age: 63
End: 2023-03-21
Payer: COMMERCIAL

## 2023-03-21 VITALS
TEMPERATURE: 98.2 F | HEIGHT: 73 IN | DIASTOLIC BLOOD PRESSURE: 80 MMHG | BODY MASS INDEX: 22.26 KG/M2 | SYSTOLIC BLOOD PRESSURE: 122 MMHG | OXYGEN SATURATION: 97 % | HEART RATE: 78 BPM | WEIGHT: 168 LBS

## 2023-03-21 PROCEDURE — 99024 POSTOP FOLLOW-UP VISIT: CPT

## 2023-05-04 ENCOUNTER — APPOINTMENT (OUTPATIENT)
Dept: PLASTIC SURGERY | Facility: CLINIC | Age: 63
End: 2023-05-04
Payer: COMMERCIAL

## 2023-05-04 VITALS
BODY MASS INDEX: 22.26 KG/M2 | DIASTOLIC BLOOD PRESSURE: 79 MMHG | OXYGEN SATURATION: 97 % | SYSTOLIC BLOOD PRESSURE: 131 MMHG | TEMPERATURE: 98 F | HEIGHT: 73 IN | HEART RATE: 87 BPM | WEIGHT: 168 LBS

## 2023-05-04 PROCEDURE — 99024 POSTOP FOLLOW-UP VISIT: CPT

## 2023-05-09 ENCOUNTER — APPOINTMENT (OUTPATIENT)
Dept: INTERNAL MEDICINE | Facility: CLINIC | Age: 63
End: 2023-05-09
Payer: COMMERCIAL

## 2023-05-09 VITALS — DIASTOLIC BLOOD PRESSURE: 70 MMHG | RESPIRATION RATE: 14 BRPM | HEART RATE: 78 BPM | SYSTOLIC BLOOD PRESSURE: 108 MMHG

## 2023-05-09 DIAGNOSIS — J01.90 ACUTE SINUSITIS, UNSPECIFIED: ICD-10-CM

## 2023-05-09 DIAGNOSIS — Z01.818 ENCOUNTER FOR OTHER PREPROCEDURAL EXAMINATION: ICD-10-CM

## 2023-05-09 PROCEDURE — 99214 OFFICE O/P EST MOD 30 MIN: CPT

## 2023-05-09 NOTE — ASSESSMENT
[FreeTextEntry1] : A 63 year male scheduled for melanoma resecton.  He is an acceptable risk to proceed with the planned procedure.  His  chronic diseases are medically optimized.\par \par He  has no history of coronary artery disease.  \par His  functional capacity is > 4 METS.\par The estimated risk of cardiac death,nonfatal MI or cardiac arrest based on the ACS operative risk core is approximately 0.1%.\par No additional cardiac testing is indicated at this time.\par \par His pre-surgical tests are not yet available.  Will review upon receipt.\par \par Acute sinusitis: treat with antibiotics prior to surgery. \par \par \par 34 minutes spent in preparation of this visit, including review of previous notes and test results, interview and examination of patient, discussion of plan, arranging for appropriate testing and treatment, and documentation.\par \par

## 2023-05-09 NOTE — PHYSICAL EXAM
[Normal] : soft, non-tender, non-distended, no masses palpated, no HSM and normal bowel sounds [de-identified] : mucoid discharge left nasal cavity

## 2023-05-09 NOTE — HISTORY OF PRESENT ILLNESS
[Aortic Stenosis] : no aortic stenosis [Atrial Fibrillation] : no atrial fibrillation [Coronary Artery Disease] : no coronary artery disease [Recent Myocardial Infarction] : no recent myocardial infarction [Implantable Device/Pacemaker] : no implantable device/pacemaker [Asthma] : no asthma [Sleep Apnea] : no sleep apnea [COPD] : no COPD [Smoker] : not a smoker [Self] : no previous adverse anesthesia reaction [Chronic Anticoagulation] : no chronic anticoagulation [Chronic Kidney Disease] : no chronic kidney disease [Diabetes] : no diabetes [FreeTextEntry1] : Melanoma excision  [FreeTextEntry2] : 23 May 2023 [FreeTextEntry3] : Olivia and  My [FreeTextEntry4] : Recurrence of melanoma at site of prior resection, on skin graft of left nasal skin [FreeTextEntry6] : Denies headache, dizziness.\par Denies cough, wheezing.\par Denies CP, SOB, SALGADO, edema, palpitations.\par Denies abdominal pain, N/V/D/C. Denies BRBPR, melena, dysphagia.\par Denies dysuria, frequency, hematuria, hesitancy.\par No bleeding or bruising tendencies.\par \par Recent onset of left nasal congestion 2 weeks ago. .Thick green discharge [FreeTextEntry8] : [facility] Activity Status Index predicts a MET of 7

## 2023-05-12 ENCOUNTER — OUTPATIENT (OUTPATIENT)
Dept: OUTPATIENT SERVICES | Facility: HOSPITAL | Age: 63
LOS: 1 days | End: 2023-05-12

## 2023-05-12 VITALS
SYSTOLIC BLOOD PRESSURE: 110 MMHG | TEMPERATURE: 98 F | HEART RATE: 82 BPM | DIASTOLIC BLOOD PRESSURE: 74 MMHG | OXYGEN SATURATION: 97 % | RESPIRATION RATE: 16 BRPM | HEIGHT: 72 IN | WEIGHT: 167.99 LBS

## 2023-05-12 DIAGNOSIS — D03.39 MELANOMA IN SITU OF OTHER PARTS OF FACE: ICD-10-CM

## 2023-05-12 DIAGNOSIS — Z98.890 OTHER SPECIFIED POSTPROCEDURAL STATES: Chronic | ICD-10-CM

## 2023-05-12 DIAGNOSIS — C43.9 MALIGNANT MELANOMA OF SKIN, UNSPECIFIED: ICD-10-CM

## 2023-05-12 LAB
ANION GAP SERPL CALC-SCNC: 13 MMOL/L — SIGNIFICANT CHANGE UP (ref 7–14)
BUN SERPL-MCNC: 11 MG/DL — SIGNIFICANT CHANGE UP (ref 7–23)
CALCIUM SERPL-MCNC: 9.2 MG/DL — SIGNIFICANT CHANGE UP (ref 8.4–10.5)
CHLORIDE SERPL-SCNC: 105 MMOL/L — SIGNIFICANT CHANGE UP (ref 98–107)
CO2 SERPL-SCNC: 24 MMOL/L — SIGNIFICANT CHANGE UP (ref 22–31)
CREAT SERPL-MCNC: 0.98 MG/DL — SIGNIFICANT CHANGE UP (ref 0.5–1.3)
EGFR: 88 ML/MIN/1.73M2 — SIGNIFICANT CHANGE UP
GLUCOSE SERPL-MCNC: 106 MG/DL — HIGH (ref 70–99)
HCT VFR BLD CALC: 42.9 % — SIGNIFICANT CHANGE UP (ref 39–50)
HGB BLD-MCNC: 14.4 G/DL — SIGNIFICANT CHANGE UP (ref 13–17)
MCHC RBC-ENTMCNC: 29.9 PG — SIGNIFICANT CHANGE UP (ref 27–34)
MCHC RBC-ENTMCNC: 33.6 GM/DL — SIGNIFICANT CHANGE UP (ref 32–36)
MCV RBC AUTO: 89 FL — SIGNIFICANT CHANGE UP (ref 80–100)
NRBC # BLD: 0 /100 WBCS — SIGNIFICANT CHANGE UP (ref 0–0)
NRBC # FLD: 0 K/UL — SIGNIFICANT CHANGE UP (ref 0–0)
PLATELET # BLD AUTO: 233 K/UL — SIGNIFICANT CHANGE UP (ref 150–400)
POTASSIUM SERPL-MCNC: 4.1 MMOL/L — SIGNIFICANT CHANGE UP (ref 3.5–5.3)
POTASSIUM SERPL-SCNC: 4.1 MMOL/L — SIGNIFICANT CHANGE UP (ref 3.5–5.3)
RBC # BLD: 4.82 M/UL — SIGNIFICANT CHANGE UP (ref 4.2–5.8)
RBC # FLD: 12.8 % — SIGNIFICANT CHANGE UP (ref 10.3–14.5)
SODIUM SERPL-SCNC: 142 MMOL/L — SIGNIFICANT CHANGE UP (ref 135–145)
WBC # BLD: 8.59 K/UL — SIGNIFICANT CHANGE UP (ref 3.8–10.5)
WBC # FLD AUTO: 8.59 K/UL — SIGNIFICANT CHANGE UP (ref 3.8–10.5)

## 2023-05-12 RX ORDER — ASPIRIN/ACETAMINOPHEN/CAFFEINE 250-250-65
2 TABLET ORAL
Qty: 0 | Refills: 0 | DISCHARGE

## 2023-05-12 RX ORDER — SODIUM CHLORIDE 9 MG/ML
1000 INJECTION, SOLUTION INTRAVENOUS
Refills: 0 | Status: DISCONTINUED | OUTPATIENT
Start: 2023-05-23 | End: 2023-06-06

## 2023-05-12 NOTE — H&P PST ADULT - ASSESSMENT
62y/o male  with h/o melanoma on his left side of nares presents for preop eval to have wide excision melanoma left side of nose on 5/23/23.

## 2023-05-12 NOTE — H&P PST ADULT - HISTORY OF PRESENT ILLNESS
64y/o male  with h/o melanoma on his left side of nares and had surgery on March 7th but the margin did not come out clear as per the pt and so pt presents for preop eval to have wide excision melanoma left side of nose on 5/23/23.

## 2023-05-12 NOTE — H&P PST ADULT - PROBLEM SELECTOR PLAN 1
64y/o male  with h/o melanoma on his left side of nares presents for preop eval to have wide excision melanoma left side of nose on 5/23/23.   labs pending, ekg in chart.  Preop instructions provided to pt.  Famotidine provided to pt with instructions

## 2023-05-12 NOTE — H&P PST ADULT - ATTENDING COMMENTS
63 y.o. man w/ m.i.s. of his left nasal ala, sched'd for w.e. and reconst. (Dr Pepe)    D/W him pre-op and today, all questions answered.    Dx, operative approach, R/B/O, and possible surg. outcomes reviewed, all questions answered, consent on chart 63 y.o. man w/ m.i.s. of his left nasal ala, sched'd for w.e. and reconst. (Dr Pepe)    D/W him pre-op and today, all questions answered.    Dx, operative approach, R/B/O, and possible surg. outcomes reviewed, all questions answered, consent on chart      Addendum:  05/23/23:  63-year-old man with peripheral margin involvement after excision of melanoma in situ from his nose.  He is scheduled for a wider excision of the approximate margin, with reconstruction by Dr. Pepe.    oncologic diagnosis, surgical approach, risks, benefits and possible surgical outcomes reviewed in detail, all questions answered.    consent on chart

## 2023-05-12 NOTE — H&P PST ADULT - REASON FOR ADMISSION
" I am going to have surgery on my nose agon for melanoma" " I am going to have surgery on my nose again for melanoma"

## 2023-05-12 NOTE — H&P PST ADULT - NSICDXPASTMEDICALHX_GEN_ALL_CORE_FT
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

## 2023-05-12 NOTE — H&P PST ADULT - NSANTHOSAYNRD_GEN_A_CORE
never tested/No. SUZE screening performed.  STOP BANG Legend: 0-2 = LOW Risk; 3-4 = INTERMEDIATE Risk; 5-8 = HIGH Risk

## 2023-05-12 NOTE — H&P PST ADULT - ANESTHESIA, PREVIOUS REACTION, PROFILE
please obtain records from 2016 & 2019 [facility], states need to be administered "a certain way",

## 2023-05-22 ENCOUNTER — TRANSCRIPTION ENCOUNTER (OUTPATIENT)
Age: 63
End: 2023-05-22

## 2023-05-23 ENCOUNTER — OUTPATIENT (OUTPATIENT)
Dept: OUTPATIENT SERVICES | Facility: HOSPITAL | Age: 63
LOS: 1 days | Discharge: HOME | End: 2023-05-23
Payer: COMMERCIAL

## 2023-05-23 ENCOUNTER — RESULT REVIEW (OUTPATIENT)
Age: 63
End: 2023-05-23

## 2023-05-23 ENCOUNTER — TRANSCRIPTION ENCOUNTER (OUTPATIENT)
Age: 63
End: 2023-05-23

## 2023-05-23 ENCOUNTER — APPOINTMENT (OUTPATIENT)
Dept: SURGICAL ONCOLOGY | Facility: HOSPITAL | Age: 63
End: 2023-05-23

## 2023-05-23 ENCOUNTER — APPOINTMENT (OUTPATIENT)
Dept: PLASTIC SURGERY | Facility: HOSPITAL | Age: 63
End: 2023-05-23

## 2023-05-23 VITALS
OXYGEN SATURATION: 100 % | TEMPERATURE: 98 F | SYSTOLIC BLOOD PRESSURE: 105 MMHG | RESPIRATION RATE: 16 BRPM | HEART RATE: 82 BPM | DIASTOLIC BLOOD PRESSURE: 71 MMHG

## 2023-05-23 VITALS
TEMPERATURE: 98 F | HEIGHT: 72 IN | WEIGHT: 167.99 LBS | DIASTOLIC BLOOD PRESSURE: 79 MMHG | RESPIRATION RATE: 16 BRPM | HEART RATE: 77 BPM | SYSTOLIC BLOOD PRESSURE: 133 MMHG | OXYGEN SATURATION: 100 %

## 2023-05-23 DIAGNOSIS — Z98.890 OTHER SPECIFIED POSTPROCEDURAL STATES: Chronic | ICD-10-CM

## 2023-05-23 DIAGNOSIS — D03.39 MELANOMA IN SITU OF OTHER PARTS OF FACE: ICD-10-CM

## 2023-05-23 PROCEDURE — 11643 EXC F/E/E/N/L MAL+MRG 2.1-3: CPT

## 2023-05-23 PROCEDURE — 88342 IMHCHEM/IMCYTCHM 1ST ANTB: CPT | Mod: 26

## 2023-05-23 PROCEDURE — 88305 TISSUE EXAM BY PATHOLOGIST: CPT | Mod: 26

## 2023-05-23 PROCEDURE — 15240 FTH/GFT F/C/C/M/N/AX/G/H/F20: CPT | Mod: 58

## 2023-05-23 PROCEDURE — 88341 IMHCHEM/IMCYTCHM EA ADD ANTB: CPT | Mod: 26

## 2023-05-23 RX ORDER — ACETAMINOPHEN 500 MG
650 TABLET ORAL ONCE
Refills: 0 | Status: DISCONTINUED | OUTPATIENT
Start: 2023-05-23 | End: 2023-06-06

## 2023-05-23 RX ORDER — ACETAMINOPHEN 500 MG
1 TABLET ORAL
Refills: 0 | DISCHARGE

## 2023-05-23 NOTE — ASU DISCHARGE PLAN (ADULT/PEDIATRIC) - NS MD DC FALL RISK RISK
For information on Fall & Injury Prevention, visit: [email or web address] OR  [email or web address] OR  https://www.cdc.gov/steadi/patient.html

## 2023-05-23 NOTE — ASU PREOP CHECKLIST - TYPE OF SOLUTION
"Prolia Injection Phone Screen      Screening questions have been asked 2-3 days prior to administration visit for Prolia. If any questions are answered with \"Yes,\" this phone encounter were will routed to ordering provider for further evaluation.     1.  When was the last injection?  12/27/21      2.  Has insurance for this injection been verified?  Yes    3.  Did you experience any new onset achiness or rashes that lasted for over a month with your previous Prolia injection?   No    4.  Do you have a fever over 101?F or a new deep cough that is unusual for you today? No    5.  Have you started any new medications in the last 6 months that you were told could affect your immune system? These may have been prescribed by oncologist, transplant, rheumatology, or dermatology.   No    6.  In the last 6 months have you have gastric bypass or parathyroid surgery?   No    7.  Do you plan dental work requiring drilling into the bone such as implants/extractions or oral surgery in the next 2-3 months?   No    8. Do you have new insurance since the last injection?    9. Have you received the Covid-19 vaccine? Yes  If No - Proceed with Prolia injection  If Yes - Date of vaccination 5/20/22. Will need to wait until 2 weeks after 2nd dose of Covid-19 vaccine before administering Prolia       Patient informed if symptoms discussed above present prior to their administration appointment, they are to notify clinic immediately.     Nickie Calderón"
lr

## 2023-05-23 NOTE — ASU DISCHARGE PLAN (ADULT/PEDIATRIC) - ASU DC SPECIAL INSTRUCTIONSFT
Initial followup with plastic surgery in the next 5-15 days, regarding matters of bandages, activities, and showering.    Dr. Lawrence should call with pathology report in approximately 2 weeks.  The conversation will determine further management.

## 2023-05-23 NOTE — BRIEF OPERATIVE NOTE - NSICDXBRIEFPREOP_GEN_ALL_CORE_FT
PRE-OP DIAGNOSIS:  Skin melanoma 23-May-2023 09:59:05  Buzz Palm  Melanoma of skin 23-May-2023 09:59:24  Buzz Palm

## 2023-05-24 ENCOUNTER — NON-APPOINTMENT (OUTPATIENT)
Age: 63
End: 2023-05-24

## 2023-05-30 ENCOUNTER — APPOINTMENT (OUTPATIENT)
Dept: PLASTIC SURGERY | Facility: CLINIC | Age: 63
End: 2023-05-30
Payer: COMMERCIAL

## 2023-05-30 VITALS
HEART RATE: 82 BPM | DIASTOLIC BLOOD PRESSURE: 77 MMHG | BODY MASS INDEX: 22.26 KG/M2 | SYSTOLIC BLOOD PRESSURE: 117 MMHG | OXYGEN SATURATION: 96 % | WEIGHT: 168 LBS | TEMPERATURE: 98.3 F | HEIGHT: 73 IN

## 2023-05-30 PROBLEM — M51.26 OTHER INTERVERTEBRAL DISC DISPLACEMENT, LUMBAR REGION: Chronic | Status: ACTIVE | Noted: 2023-05-12

## 2023-05-30 PROBLEM — Z87.19 PERSONAL HISTORY OF OTHER DISEASES OF THE DIGESTIVE SYSTEM: Chronic | Status: ACTIVE | Noted: 2023-05-12

## 2023-05-30 PROCEDURE — 99024 POSTOP FOLLOW-UP VISIT: CPT

## 2023-06-08 LAB — SURGICAL PATHOLOGY STUDY: SIGNIFICANT CHANGE UP

## 2023-06-12 NOTE — HISTORY OF PRESENT ILLNESS
[de-identified] : 62-year-old man.\par \par Last seen by me in December 2016.\par \par \par Newly diagnosed melanoma in situ of the LEFT NASAL ALA.\par \par Dermatology describes this as a 2 mm light brown macule adjacent to the previous surgical scar.\par \par October 2022, a biopsy from the same area by dermatology demonstrated solar lentigo with atypia, NOT felt to be diagnostic of melanoma in situ.\par "Close clinical follow-up is recommended".\par \par 1/9/2023:\par This area was noted to have changed prompting a second biopsy, with the above diagnosis.\par \par \par \par + Prior personal history:\par October 2016:\par Wide excision of a melanoma in situ from the LEFT NASAL ALA.\par Plastics: Dr. Leighton BENAVIDEZ.\par \par \par No other personal history of skin cancer.\par \par No prior personal history of malignancy.\par \par \par No relatives with melanoma.\par \par + Family history of malignancy:\par Maternal grandfather: Liver cancer\par \par \par Derm: Dr. Umu BANGURA\par \par \par PMD:\par Dr. Arie BOSWELL, he may be changing in 2023\par \par No pacemaker or defibrillator.\par No anticoagulants.\par \par No current prescription medications.\par \par + Crohn's.\par + Previous colon resection.\par Also, history of bowel resection and enterectomy.\par \par + History of cervical stenosis.\par + Previous back operations at [facility].\par \par 2019 he had a bilateral inguinal hernia repair by Dr. Chaim Sheikh\par \par \par Most recent eye examination.\par June 2022 with Dr. Serna was unremarkable\par \par \par Gastroenterology:\par Dr. Matt NGUYEN in [place].\par + Crohn's.\par Most recent colonoscopy was in 2019, okay x5 years

## 2023-06-12 NOTE — ASSESSMENT
[FreeTextEntry1] : 62-year-old man recently diagnosed with a melanoma in situ of the left side of his nasal pop.\par \par In 2016 he had resection of a similar lesion, with negative margins and reconstruction.\par \par This is a new in situ lesion, therefore more likely to represent a new primary, than a recurrence; most consistent with a "field defect".\par \par I reviewed the indications, technique for appropriate excision of the area, coordinate with plastic surgery for reconstruction.\par \par Technique, risk, benefits, alternatives, and possible outcomes reviewed in detail, all questions answered.\par \par He understands would like to proceed with surgery.\par Paperwork for scheduling submitted\par \par Note dictated to his referring physician.\par \par \par 3/17/2023.\par I called him but had to leave a voicemail.\par March 7, 2023, he had wide excision of a melanoma in situ from the left side of his nose.\par Plastics: Dr. Leighton Pepe.\par Surgical pathology:\par + Scar from previous biopsy.\par + Residual melanoma in situ.\par + Involvement of peripheral margin (left: 12:00-3:00-6:00), anatomically this represented the skin graft from his 2016 melanoma operation.\par \par Once we speak, I will be arranging for a wider excision of the area.\par \par Note dictated to his physicians.\par \par \par \par 3/20/2023.\par Summary note.\par He and I spoke later in the day on March 17, 2023.\par I reviewed the above information, the need for a further excision.\par He understands and agrees.\par Paperwork submitted for ~May 2023, to allow healing to occur from the recent operation.\par \par \par \par 06/12/2023.\par We spoke.\par May 23, 2023, he had an additional a wider excision from his nasal tip/left side, for margin involvement (12:00-3:00-6:00) with melanoma in situ, of a recent excision for stage 0 melanoma from the left side of his nose (3/7/2023).\par Plastics: Dr. Leighton Pepe.\par Surgical pathology:\par + Residual melanoma in situ.\par + Concern of focus of invasion to 0.6 mm.\par + Involvement of the 6:00 margin, which is the alar rim of his naris.\par \par I suggested a wider reexcision of this area with reconstruction.\par He is reluctant to have more surgery, presently, and may opt for surveillance instead.\par \par Reviewed in detail, all questions answered.\par \par Note dictated to his physicians.

## 2023-06-12 NOTE — REVIEW OF SYSTEMS
[Negative] : Heme/Lymph [FreeTextEntry5] : Hypercholesterolemia, controlled by diet and exercise [FreeTextEntry7] : Crohn's [FreeTextEntry8] : Prior enterectomy for small bowel obstruction [de-identified] : Skin cancer

## 2023-06-12 NOTE — PHYSICAL EXAM
[Normal] : supple, no neck mass and thyroid not enlarged [Normal Neck Lymph Nodes] : normal neck lymph nodes  [Normal Supraclavicular Lymph Nodes] : normal supraclavicular lymph nodes [Normal Axillary Lymph Nodes] : normal axillary lymph nodes [Normal] : full range of motion and no deformities appreciated [de-identified] : Groins not examined [de-identified] : Below

## 2023-06-12 NOTE — REASON FOR VISIT
[Initial Consultation] : an initial consultation for [Other: _____] : [unfilled] [FreeTextEntry2] : Newly diagnosed melanoma in situ of his left nasal pop

## 2023-06-13 ENCOUNTER — APPOINTMENT (OUTPATIENT)
Dept: PLASTIC SURGERY | Facility: CLINIC | Age: 63
End: 2023-06-13
Payer: COMMERCIAL

## 2023-06-13 VITALS
SYSTOLIC BLOOD PRESSURE: 127 MMHG | HEART RATE: 84 BPM | OXYGEN SATURATION: 97 % | DIASTOLIC BLOOD PRESSURE: 81 MMHG | HEIGHT: 73 IN | WEIGHT: 168 LBS | TEMPERATURE: 97.9 F | BODY MASS INDEX: 22.26 KG/M2

## 2023-06-13 DIAGNOSIS — Z09 ENCOUNTER FOR FOLLOW-UP EXAMINATION AFTER COMPLETED TREATMENT FOR CONDITIONS OTHER THAN MALIGNANT NEOPLASM: ICD-10-CM

## 2023-06-13 PROCEDURE — 99024 POSTOP FOLLOW-UP VISIT: CPT

## 2023-11-06 ENCOUNTER — APPOINTMENT (OUTPATIENT)
Dept: SURGICAL ONCOLOGY | Facility: CLINIC | Age: 63
End: 2023-11-06
Payer: COMMERCIAL

## 2023-11-06 VITALS
HEIGHT: 73 IN | SYSTOLIC BLOOD PRESSURE: 136 MMHG | RESPIRATION RATE: 16 BRPM | HEART RATE: 98 BPM | DIASTOLIC BLOOD PRESSURE: 81 MMHG | OXYGEN SATURATION: 97 % | BODY MASS INDEX: 22.53 KG/M2 | WEIGHT: 170 LBS

## 2023-11-06 PROCEDURE — 99215 OFFICE O/P EST HI 40 MIN: CPT

## 2023-11-28 ENCOUNTER — APPOINTMENT (OUTPATIENT)
Dept: RADIATION ONCOLOGY | Facility: CLINIC | Age: 63
End: 2023-11-28
Payer: COMMERCIAL

## 2023-11-28 VITALS
OXYGEN SATURATION: 98 % | TEMPERATURE: 96.4 F | HEIGHT: 73 IN | BODY MASS INDEX: 22.21 KG/M2 | SYSTOLIC BLOOD PRESSURE: 132 MMHG | DIASTOLIC BLOOD PRESSURE: 83 MMHG | WEIGHT: 167.55 LBS | HEART RATE: 82 BPM | RESPIRATION RATE: 18 BRPM

## 2023-11-28 PROCEDURE — 99204 OFFICE O/P NEW MOD 45 MIN: CPT | Mod: 25,GC

## 2023-11-28 RX ORDER — CEFADROXIL 500 MG/1
500 CAPSULE ORAL TWICE DAILY
Qty: 14 | Refills: 0 | Status: DISCONTINUED | COMMUNITY
Start: 2023-05-23 | End: 2023-11-28

## 2023-11-28 RX ORDER — CEFUROXIME AXETIL 500 MG/1
500 TABLET ORAL
Qty: 14 | Refills: 0 | Status: DISCONTINUED | COMMUNITY
Start: 2023-05-09 | End: 2023-11-28

## 2023-12-14 ENCOUNTER — APPOINTMENT (OUTPATIENT)
Dept: MRI IMAGING | Facility: CLINIC | Age: 63
End: 2023-12-14
Payer: COMMERCIAL

## 2023-12-14 ENCOUNTER — OUTPATIENT (OUTPATIENT)
Dept: OUTPATIENT SERVICES | Facility: HOSPITAL | Age: 63
LOS: 1 days | End: 2023-12-14
Payer: COMMERCIAL

## 2023-12-14 DIAGNOSIS — Z98.890 OTHER SPECIFIED POSTPROCEDURAL STATES: Chronic | ICD-10-CM

## 2023-12-14 DIAGNOSIS — D03.39 MELANOMA IN SITU OF OTHER PARTS OF FACE: ICD-10-CM

## 2023-12-14 PROCEDURE — 70542 MRI ORBIT/FACE/NECK W/DYE: CPT | Mod: 26

## 2023-12-14 PROCEDURE — 70542 MRI ORBIT/FACE/NECK W/DYE: CPT

## 2023-12-14 PROCEDURE — A9585: CPT

## 2023-12-21 ENCOUNTER — APPOINTMENT (OUTPATIENT)
Dept: RADIATION ONCOLOGY | Facility: CLINIC | Age: 63
End: 2023-12-21
Payer: COMMERCIAL

## 2023-12-21 VITALS
WEIGHT: 171.08 LBS | HEART RATE: 75 BPM | OXYGEN SATURATION: 100 % | SYSTOLIC BLOOD PRESSURE: 131 MMHG | HEIGHT: 73 IN | TEMPERATURE: 96.98 F | RESPIRATION RATE: 18 BRPM | BODY MASS INDEX: 22.67 KG/M2 | DIASTOLIC BLOOD PRESSURE: 80 MMHG

## 2023-12-21 PROCEDURE — 99213 OFFICE O/P EST LOW 20 MIN: CPT

## 2023-12-22 ENCOUNTER — NON-APPOINTMENT (OUTPATIENT)
Age: 63
End: 2023-12-22

## 2023-12-27 ENCOUNTER — OUTPATIENT (OUTPATIENT)
Dept: OUTPATIENT SERVICES | Facility: HOSPITAL | Age: 63
LOS: 1 days | Discharge: HOME | End: 2023-12-27

## 2023-12-27 DIAGNOSIS — C43.31 MALIGNANT MELANOMA OF NOSE: ICD-10-CM

## 2023-12-27 DIAGNOSIS — Z98.890 OTHER SPECIFIED POSTPROCEDURAL STATES: Chronic | ICD-10-CM

## 2023-12-27 NOTE — HISTORY OF PRESENT ILLNESS
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 This was a 2 mm light brown asymmetric macule identified on dermatologic evaluation by Dr. Umu Calderón. Initially diagnosed in October 2016, patient underwent Wide excision of a melanoma in situ from the LEFT NASAL ALA. Plastics: Dr. Leighton BENAVIDEZ. He devloped a recurrence in march 2023, he ynderwent Wide excision of a melanoma in situ from the left side of the nose + Margin involvement from 12:00-3:00-6:00. In May 2023: He had a reexcision of residual melanoma in situ from the left nasal tip by Dr. Leighton Benavidez + Margin involvement at 6:00. 5:00 margin was <3 mm.  He was recommended reexcision. Patient declined and plans for Radiation therapy consideration. Biopsy 10/19/23 Atypical intraepidermal melanocyctic proliferation and scar. This finding is likely a recurrence of the melanoma in situ  He had a second opinion with Dr. Lora from [facility] who endorsed current treatment plan and recommendations. He presented for radiation therapy consideration.   12/14/2023 MRI Orbit, Face, and or Neck: IMPRESSION:   No evidence of residual or recurrent tumor.  12/21/2023 Returns today to discuss MRI results and treatment with radiation.

## 2023-12-28 ENCOUNTER — RESULT REVIEW (OUTPATIENT)
Age: 63
End: 2023-12-28

## 2024-01-03 ENCOUNTER — NON-APPOINTMENT (OUTPATIENT)
Age: 64
End: 2024-01-03

## 2024-01-03 ENCOUNTER — APPOINTMENT (OUTPATIENT)
Dept: HEMATOLOGY ONCOLOGY | Facility: CLINIC | Age: 64
End: 2024-01-03
Payer: COMMERCIAL

## 2024-01-03 VITALS
BODY MASS INDEX: 22.26 KG/M2 | OXYGEN SATURATION: 97 % | SYSTOLIC BLOOD PRESSURE: 130 MMHG | TEMPERATURE: 97.9 F | WEIGHT: 167.99 LBS | HEIGHT: 73 IN | DIASTOLIC BLOOD PRESSURE: 80 MMHG | HEART RATE: 81 BPM | RESPIRATION RATE: 17 BRPM

## 2024-01-03 DIAGNOSIS — D03.39 MELANOMA IN SITU OF OTHER PARTS OF FACE: ICD-10-CM

## 2024-01-03 PROCEDURE — 99205 OFFICE O/P NEW HI 60 MIN: CPT

## 2024-01-03 NOTE — CONSULT LETTER
[Dear  ___] : Dear  [unfilled], [Consult Letter:] : I had the pleasure of evaluating your patient, [unfilled]. [Please see my note below.] : Please see my note below. [Consult Closing:] : Thank you very much for allowing me to participate in the care of this patient.  If you have any questions, please do not hesitate to contact me. [Sincerely,] : Sincerely, [DrTerrell  ___] : Dr. HEREDIA [DrTerrell ___] : Dr. HEREDIA

## 2024-01-03 NOTE — ASSESSMENT
[FreeTextEntry1] : Patient had surgery in 2016 on tip of nose for melanoma in situ. 3/2023 there was a newly diagnosed (likely recurrent) melanoma in situ of the nasal tip (left soft triangle). This was a 2 mm light brown asymmetric macule identified on dermatologic evaluation by Dr. Umu Calderón. In May 2023, he had a re-excision of residual melanoma in situ from the left nasal tip by Dr. Lawrence and Plastics: Dr. Leighton Pepe. Patient had another biopsy of nose 10/2023 with [facility], and this was read as melanoma in situ. However, on re-read at [facility], this is NOT melanoma in situ, and "Atypical lentiginous melanocytic hyperplasia with underlying scar" was diagnosed. Patient did see Dr Andrade from radiation, and no radiation is planned. MRI done showed no tumor.  I discussed case with Dermpath - not able to define melanoma presence. I would not advise any further treatment with radiation or surgery at this time. I do recommend close follow-up with dermatology. The use of  topical imiquimod (Aldara) could be considered as an adjuvant; although I am not familiar with the traditional indications and would leave this to the discretion of the dermatologist.  Patient will follow with Dr Lawrence in 6 months.

## 2024-01-03 NOTE — PHYSICAL EXAM
[Fully active, able to carry on all pre-disease performance without restriction] : Status 0 - Fully active, able to carry on all pre-disease performance without restriction [Normal] : well developed, well nourished, in no acute distress [de-identified] : patent's surgical field looks free of nodules and no pigmentation noted.

## 2024-01-03 NOTE — HISTORY OF PRESENT ILLNESS
[de-identified] : 63 year old male presenting to the office for an initial consultation of melanoma.  Patient had surgery in 2016 on tip of nose for melanoma in situ. 3/2023 there was a newly diagnosed (likely recurrent) melanoma in situ of the nasal tip (left soft triangle). This was a 2 mm light brown asymmetric macule identified on dermatologic evaluation by Dr. Umu Calderón. In May 2023, he had a re-excision of residual melanoma in situ from the left nasal tip by Dr. Lawrence and Plastics: Dr. Leighton Pepe. Patient had another biopsy of nose 10/2023 with [facility], and this was read as melanoma in situ. However, on re-read at [facility], this is NOT melanoma in situ, and "Atypical lentiginous melanocytic hyperplasia with underlying scar" was diagnosed. Patient did see Dr Andrade from radiation, and no radiation is planned. MRI done showed no tumor.  I discussed case with Dermpath - not able to define melanoma presence. I would not advise any further treatment with radiation or surgery at this time. I do recommend close follow-up with dermatology. The use of  topical imiquimod (Aldara) could be considered as an adjuvant; although I am not familiar with the traditional indications and would leave this to the discretion of the dermatologist. [de-identified] : Surgical Oncology: Neftaly Lawrence Radiation Oncology: Roly Andrade Dermatology: Umu Calderón

## 2024-01-29 NOTE — ASSESSMENT
[FreeTextEntry1] : 63-year-old man.  Recently diagnosed recurrent melanoma in situ of the left side of the nasal tip. He is May 2023 excision had microscopically involved margins, but he declined re-excision at that time.  We do lengthy conversation regarding management options.  I offered excision, with reconstruction, and surgical pathology results which would guide further management.  Reviewed in detail.  All questions answered.  He has decided to seek a consultation with radiation oncology to use this as primary treatment. He may see Dr. Lana Nevarez, or Dr. Roly Andrade at our facility.  Note dictated to his referring physicians.   1/29/2024. Summary note. December 2023, he met with radiation oncology (Dr. Roly Andrade). He recommended a wider excision, rather than radiation.  January 2024, he met with medical oncology (Dr. Abdirashid Santiago). He felt that there is no role for further surgery presently. He thought that expectant management, or Aldara cream were reasonable options.  Today, I spoke to his dermatologist (Dr. Umu Calderón). I updated her. She may refer him to Dr. Noe Solano in [place] who does Mohs procedures for melanoma.

## 2024-01-29 NOTE — HISTORY OF PRESENT ILLNESS
[de-identified] : 63-year-old gentleman.  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  This was a 2 mm light brown asymmetric macule identified on dermatologic evaluation by Dr. Umu Bangura.  May 2023: He had a reexcision of residual melanoma in situ from the left nasal tip by me. Plastics: Dr. Leighton Pepe. + Margin involvement at 6:00. 5:00 margin was <3 mm.  I had recommended reexcision. He declined.  The above operation was preceded 3/7/2023: Wide excision of a melanoma in situ from the left side of the nose. Plastics: Dr. Leighton Pepe. + Margin involvement from 12:00-3:00-6:00.  He had a second opinion with [place]. Lora from [facility] who endorsed our current treatment plan and recommendations.   October 2022, a biopsy from the same area by dermatology demonstrated solar lentigo with atypia, NOT felt to be diagnostic of melanoma in situ. "Close clinical follow-up is recommended".  1/9/2023: This area was noted to have changed prompting a second biopsy, with the above diagnosis.    + Prior personal history: October 2016: Wide excision of a melanoma in situ from the LEFT NASAL ALA. Plastics: Dr. Leighton PEPE.   No other personal history of skin cancer.  No prior personal history of malignancy.   No relatives with melanoma.  + Family history of malignancy: Maternal grandfather: Liver cancer   Derm: Dr. Umu BANGURA   PMD: Dr. Cleve SIDDIQUI. He used to see Dr. Arie Washington.  No pacemaker or defibrillator. No anticoagulants.  No current prescription medications.  + Crohn's. + Previous colon resection. Also, history of bowel resection and enterectomy.  + History of cervical stenosis. + Previous back operations at [facility].  2019 he had a bilateral inguinal hernia repair by Dr. Chaim Shiekh   Most recent eye examination. June 2023 with Dr. Serna was unremarkable.   Gastroenterology: Dr. Matt NGUYEN in [place]. + Crohn's. Most recent colonoscopy was in 2019, okay x5 years

## 2024-03-05 ENCOUNTER — APPOINTMENT (OUTPATIENT)
Dept: DERMATOLOGY | Facility: CLINIC | Age: 64
End: 2024-03-05
Payer: COMMERCIAL

## 2024-03-05 PROCEDURE — 99205 OFFICE O/P NEW HI 60 MIN: CPT

## 2024-03-06 NOTE — PHYSICAL EXAM
[FreeTextEntry3] : surgical scar at nasal tip, on dermoscopic evaluation there is a light brown macule

## 2024-03-06 NOTE — HISTORY OF PRESENT ILLNESS
[FreeTextEntry1] : np [de-identified] : 65 yo M w/ hx of MIS on nasal tip w/possible recurrence (see history below) here for consultation and possible imiquimod.   Initial MIS of left nasal tip diagnosed by Krystina Cerda in 2017. Underwent WLE w/Dr. Lawrence and reconstruction w/ Dr. Pepe that same year. In March 2023, noted to have new brown macule -> bx'ed by Dr. Cerda showing recurrent MIS. Underwent WLE w/Melinda with positive margins noted on excisional path. Underwent re-excision in May 2023 with positive margins again at 5:00 (as well as additional MIS extending within 0.3mm of 6:00 margin). He was then referred to rad onc, however radiation deferred due to absence of tumor on MRI. The area of concern was rebiopsied by Dr. Cerda in Oct and reported as recurrent MIS by outside pathology. These slides were then obtained for internal consult at [facility], and read as "atypical lentiginous melanocytic hyperplasia with underlying scar"  w/negative PRAME testing.  Saw Dr. Martinez and tentative plan to defer any further surgical intervention. w/ consideration for aldara.   Overall pt pt feels well, hesitant o undergo further surgery.

## 2024-03-06 NOTE — ASSESSMENT
[FreeTextEntry1] : #history of MIS (2017) on nasal tip s/p WLE w/ recurrence in 2023, now s/p 2 more WLE in 2023), both w/  possible +margins --Extensive review of history, refer to HPI for more details -- Outside biopsy (Krystina Cerda) 10/2023 read as residual MIS, internal consult of bx read as atypical lentiginous melanocytic hyperplasia with underlying scar" , neg PRAME testing -- Extensive conversation regarding interpretation of biopsy, well-established subjectivity that may exist in pathology interpretations of "MIS," and limited evidence for efficacy regarding topical imiquimod for lentigo maligna  -- Agree with deferring surgery and radiation for now given hx of serial surgical excisions with now conflicting pathology reports, as well as absence of clinical & radiological signs of recurrence  -- Whlie there may be a role for topical imiquimod, it would be more prudent to first reach a consensus regarding underlying diagnosis and whether this represents true recurrence.  -- Given the complexity of this case, I think he would be best suited for comprehensive evaluation at [facility] in [place], where a multi-disciplinary team of Mohs surgeons, pigmented lesion specialists and melanoma specialists may collectively interpret his history and findings. This will entail sending all biopsy slides (including initial bx from 2017 if possible) to [facility] pathologist for reinterpretation. Will also enable the patient to be examined using confocal microscopy and monitored w/more objective technology.  - Obtained permission from patient to discussed patient with [facility] dermatologist and melanoma surgeon Dr. Migdalia Shah who will have her office reach out to the patient .     >60 min spent

## 2024-11-08 ENCOUNTER — APPOINTMENT (OUTPATIENT)
Dept: INTERNAL MEDICINE | Facility: CLINIC | Age: 64
End: 2024-11-08

## 2025-02-24 ENCOUNTER — APPOINTMENT (OUTPATIENT)
Dept: INTERNAL MEDICINE | Facility: CLINIC | Age: 65
End: 2025-02-24

## 2025-02-24 ENCOUNTER — OUTPATIENT (OUTPATIENT)
Dept: OUTPATIENT SERVICES | Facility: HOSPITAL | Age: 65
LOS: 1 days | End: 2025-02-24
Payer: COMMERCIAL

## 2025-02-24 VITALS
DIASTOLIC BLOOD PRESSURE: 78 MMHG | WEIGHT: 171 LBS | HEIGHT: 73 IN | OXYGEN SATURATION: 94 % | HEART RATE: 82 BPM | BODY MASS INDEX: 22.66 KG/M2 | SYSTOLIC BLOOD PRESSURE: 102 MMHG

## 2025-02-24 DIAGNOSIS — Z00.00 ENCOUNTER FOR GENERAL ADULT MEDICAL EXAMINATION W/OUT ABNORMAL FINDINGS: ICD-10-CM

## 2025-02-24 DIAGNOSIS — Z98.890 OTHER SPECIFIED POSTPROCEDURAL STATES: Chronic | ICD-10-CM

## 2025-02-24 DIAGNOSIS — I10 ESSENTIAL (PRIMARY) HYPERTENSION: ICD-10-CM

## 2025-02-24 DIAGNOSIS — E78.5 HYPERLIPIDEMIA, UNSPECIFIED: ICD-10-CM

## 2025-02-24 DIAGNOSIS — Z00.00 ENCOUNTER FOR GENERAL ADULT MEDICAL EXAMINATION WITHOUT ABNORMAL FINDINGS: ICD-10-CM

## 2025-02-24 PROCEDURE — 99396 PREV VISIT EST AGE 40-64: CPT

## 2025-02-24 PROCEDURE — G0463: CPT

## 2025-02-25 LAB
ALBUMIN SERPL ELPH-MCNC: 4.8 G/DL
ALP BLD-CCNC: 88 U/L
ALT SERPL-CCNC: 18 U/L
ANION GAP SERPL CALC-SCNC: 13 MMOL/L
AST SERPL-CCNC: 21 U/L
BASOPHILS # BLD AUTO: 0.11 K/UL
BASOPHILS NFR BLD AUTO: 1.3 %
BILIRUB SERPL-MCNC: 0.6 MG/DL
BUN SERPL-MCNC: 13 MG/DL
CALCIUM SERPL-MCNC: 10 MG/DL
CHLORIDE SERPL-SCNC: 105 MMOL/L
CHOLEST SERPL-MCNC: 259 MG/DL
CO2 SERPL-SCNC: 26 MMOL/L
CREAT SERPL-MCNC: 1.05 MG/DL
EGFR: 80 ML/MIN/1.73M2
EOSINOPHIL # BLD AUTO: 0.26 K/UL
EOSINOPHIL NFR BLD AUTO: 3.1 %
ESTIMATED AVERAGE GLUCOSE: 100 MG/DL
FOLATE SERPL-MCNC: 6.4 NG/ML
GLUCOSE SERPL-MCNC: 92 MG/DL
HBA1C MFR BLD HPLC: 5.1 %
HCT VFR BLD CALC: 49.3 %
HDLC SERPL-MCNC: 33 MG/DL
HGB BLD-MCNC: 15.8 G/DL
IMM GRANULOCYTES NFR BLD AUTO: 0.5 %
LDLC SERPL CALC-MCNC: 186 MG/DL
LYMPHOCYTES # BLD AUTO: 1.68 K/UL
LYMPHOCYTES NFR BLD AUTO: 19.9 %
MAN DIFF?: NORMAL
MCHC RBC-ENTMCNC: 31.1 PG
MCHC RBC-ENTMCNC: 32 G/DL
MCV RBC AUTO: 97 FL
MONOCYTES # BLD AUTO: 0.55 K/UL
MONOCYTES NFR BLD AUTO: 6.5 %
NEUTROPHILS # BLD AUTO: 5.81 K/UL
NEUTROPHILS NFR BLD AUTO: 68.7 %
NONHDLC SERPL-MCNC: 227 MG/DL
PLATELET # BLD AUTO: 233 K/UL
POTASSIUM SERPL-SCNC: 4.5 MMOL/L
PROT SERPL-MCNC: 7.1 G/DL
PSA FREE FLD-MCNC: 27 %
PSA FREE SERPL-MCNC: 0.51 NG/ML
PSA SERPL-MCNC: 1.85 NG/ML
RBC # BLD: 5.08 M/UL
RBC # FLD: 12.9 %
SODIUM SERPL-SCNC: 144 MMOL/L
TRIGL SERPL-MCNC: 214 MG/DL
VIT B12 SERPL-MCNC: 261 PG/ML
WBC # FLD AUTO: 8.45 K/UL

## 2025-03-14 ENCOUNTER — APPOINTMENT (OUTPATIENT)
Dept: CARDIOLOGY | Facility: CLINIC | Age: 65
End: 2025-03-14
Payer: COMMERCIAL

## 2025-03-14 ENCOUNTER — NON-APPOINTMENT (OUTPATIENT)
Age: 65
End: 2025-03-14

## 2025-03-14 VITALS
BODY MASS INDEX: 22.03 KG/M2 | DIASTOLIC BLOOD PRESSURE: 70 MMHG | OXYGEN SATURATION: 97 % | WEIGHT: 167 LBS | HEART RATE: 76 BPM | SYSTOLIC BLOOD PRESSURE: 110 MMHG

## 2025-03-14 DIAGNOSIS — Z00.00 ENCOUNTER FOR GENERAL ADULT MEDICAL EXAMINATION W/OUT ABNORMAL FINDINGS: ICD-10-CM

## 2025-03-14 PROCEDURE — 99204 OFFICE O/P NEW MOD 45 MIN: CPT

## 2025-03-14 PROCEDURE — G0537: CPT

## 2025-03-14 PROCEDURE — 93000 ELECTROCARDIOGRAM COMPLETE: CPT

## 2025-03-14 PROCEDURE — G2211 COMPLEX E/M VISIT ADD ON: CPT

## 2025-03-19 ENCOUNTER — APPOINTMENT (OUTPATIENT)
Dept: CT IMAGING | Facility: CLINIC | Age: 65
End: 2025-03-19
Payer: COMMERCIAL

## 2025-03-19 ENCOUNTER — OUTPATIENT (OUTPATIENT)
Dept: OUTPATIENT SERVICES | Facility: HOSPITAL | Age: 65
LOS: 1 days | End: 2025-03-19
Payer: COMMERCIAL

## 2025-03-19 DIAGNOSIS — Z98.890 OTHER SPECIFIED POSTPROCEDURAL STATES: Chronic | ICD-10-CM

## 2025-03-19 DIAGNOSIS — E78.5 HYPERLIPIDEMIA, UNSPECIFIED: ICD-10-CM

## 2025-03-19 DIAGNOSIS — Z00.8 ENCOUNTER FOR OTHER GENERAL EXAMINATION: ICD-10-CM

## 2025-03-19 PROCEDURE — 75571 CT HRT W/O DYE W/CA TEST: CPT | Mod: 26

## 2025-03-19 PROCEDURE — 75571 CT HRT W/O DYE W/CA TEST: CPT

## 2025-03-21 ENCOUNTER — APPOINTMENT (OUTPATIENT)
Dept: CARDIOLOGY | Facility: CLINIC | Age: 65
End: 2025-03-21
Payer: COMMERCIAL

## 2025-03-21 DIAGNOSIS — E78.5 HYPERLIPIDEMIA, UNSPECIFIED: ICD-10-CM

## 2025-03-21 PROCEDURE — 93306 TTE W/DOPPLER COMPLETE: CPT

## 2025-03-21 RX ORDER — ATORVASTATIN CALCIUM 40 MG/1
40 TABLET, FILM COATED ORAL
Qty: 1 | Refills: 2 | Status: ACTIVE | COMMUNITY
Start: 2025-03-21 | End: 1900-01-01

## 2025-04-17 NOTE — ASU PATIENT PROFILE, ADULT - NSALCOHOLSOBERPERIOD_GEN_A_CORE_FT
Yes, get serum free and total testosterone levels and hemoglobin and hematocrit just prior to his injection.  I placed order for the hemoglobin and hematocrit    n/a

## 2025-04-28 ENCOUNTER — APPOINTMENT (OUTPATIENT)
Dept: CARDIOLOGY | Facility: CLINIC | Age: 65
End: 2025-04-28
Payer: COMMERCIAL

## 2025-04-28 VITALS
OXYGEN SATURATION: 96 % | SYSTOLIC BLOOD PRESSURE: 107 MMHG | DIASTOLIC BLOOD PRESSURE: 60 MMHG | HEART RATE: 83 BPM | WEIGHT: 165 LBS | BODY MASS INDEX: 21.77 KG/M2

## 2025-04-28 DIAGNOSIS — Z00.00 ENCOUNTER FOR GENERAL ADULT MEDICAL EXAMINATION W/OUT ABNORMAL FINDINGS: ICD-10-CM

## 2025-04-28 DIAGNOSIS — K50.90 CROHN'S DISEASE, UNSPECIFIED, W/OUT COMPLICATIONS: ICD-10-CM

## 2025-04-28 DIAGNOSIS — E78.5 HYPERLIPIDEMIA, UNSPECIFIED: ICD-10-CM

## 2025-04-28 PROCEDURE — 99214 OFFICE O/P EST MOD 30 MIN: CPT

## 2025-04-28 PROCEDURE — G2211 COMPLEX E/M VISIT ADD ON: CPT

## 2025-04-29 LAB
25(OH)D3 SERPL-MCNC: 8 NG/ML
ALBUMIN SERPL ELPH-MCNC: 4.5 G/DL
ALP BLD-CCNC: 74 U/L
ALT SERPL-CCNC: 21 U/L
ANION GAP SERPL CALC-SCNC: 12 MMOL/L
AST SERPL-CCNC: 25 U/L
BILIRUB SERPL-MCNC: 1 MG/DL
BUN SERPL-MCNC: 9 MG/DL
CALCIUM SERPL-MCNC: 9.4 MG/DL
CHLORIDE SERPL-SCNC: 106 MMOL/L
CHOLEST SERPL-MCNC: 131 MG/DL
CO2 SERPL-SCNC: 25 MMOL/L
CREAT SERPL-MCNC: 0.99 MG/DL
EGFRCR SERPLBLD CKD-EPI 2021: 86 ML/MIN/1.73M2
ESTIMATED AVERAGE GLUCOSE: 103 MG/DL
FOLATE SERPL-MCNC: 9.5 NG/ML
GLUCOSE SERPL-MCNC: 84 MG/DL
HBA1C MFR BLD HPLC: 5.2 %
HDLC SERPL-MCNC: 39 MG/DL
LDLC SERPL-MCNC: 69 MG/DL
NONHDLC SERPL-MCNC: 92 MG/DL
POTASSIUM SERPL-SCNC: 4.1 MMOL/L
PROT SERPL-MCNC: 6.5 G/DL
SODIUM SERPL-SCNC: 143 MMOL/L
TRIGL SERPL-MCNC: 127 MG/DL
VIT B12 SERPL-MCNC: 255 PG/ML

## 2025-09-05 ENCOUNTER — APPOINTMENT (OUTPATIENT)
Dept: CARDIOLOGY | Facility: CLINIC | Age: 65
End: 2025-09-05
Payer: COMMERCIAL

## 2025-09-05 VITALS
BODY MASS INDEX: 21.37 KG/M2 | SYSTOLIC BLOOD PRESSURE: 102 MMHG | HEART RATE: 71 BPM | WEIGHT: 162 LBS | OXYGEN SATURATION: 96 % | DIASTOLIC BLOOD PRESSURE: 70 MMHG

## 2025-09-05 DIAGNOSIS — E78.01 FAMILIAL HYPERCHOLESTEROLEMIA: ICD-10-CM

## 2025-09-05 DIAGNOSIS — E78.5 HYPERLIPIDEMIA, UNSPECIFIED: ICD-10-CM

## 2025-09-05 PROCEDURE — 99214 OFFICE O/P EST MOD 30 MIN: CPT

## 2025-09-05 PROCEDURE — G2211 COMPLEX E/M VISIT ADD ON: CPT

## (undated) DEVICE — COTTONBALL LG

## (undated) DEVICE — SUT CHROMIC 4-0 27" SH

## (undated) DEVICE — DRSG XEROFORM 5 X 9"

## (undated) DEVICE — SUT VICRYL 2-0 27" SH UNDYED

## (undated) DEVICE — SOL IRR POUR NS 0.9% 500ML

## (undated) DEVICE — DRSG DERMABOND 0.7ML

## (undated) DEVICE — SUT MONOCRYL 4-0 27" PS-2 UNDYED

## (undated) DEVICE — SUT PLAIN GUT FAST ABSORBING 5-0 PC-1

## (undated) DEVICE — SUT VICRYL 3-0 27" SH UNDYED

## (undated) DEVICE — SYR LUER LOK 20CC

## (undated) DEVICE — POSITIONER STRAP ARMBOARD VELCRO TS-30

## (undated) DEVICE — LABELS BLANK W PEN

## (undated) DEVICE — DRAPE SURGICAL #1010

## (undated) DEVICE — SUT MONOCRYL 4-0 18" P-3 UNDYED

## (undated) DEVICE — DRAPE TOWEL BLUE 17" X 24"

## (undated) DEVICE — DRSG KLING 6"

## (undated) DEVICE — SUT PLAIN GUT 4-0 27" SH

## (undated) DEVICE — PACKING GAUZE PLAIN 2"

## (undated) DEVICE — PACK MINOR WITH LAP

## (undated) DEVICE — DRSG COBAN 4"

## (undated) DEVICE — PACK MAJOR ABDOMINAL WITH LAP

## (undated) DEVICE — DRSG KERLIX ROLL 4.5"

## (undated) DEVICE — ELCTR BOVIE TIP NEEDLE INSULATED 2.8" EDGE

## (undated) DEVICE — PROTECTOR HEEL / ELBOW FLUFFY

## (undated) DEVICE — GLV 7 PROTEXIS (WHITE)

## (undated) DEVICE — ELCTR BOVIE TIP BLADE INSULATED 2.75" EDGE

## (undated) DEVICE — PREP BETADINE KIT

## (undated) DEVICE — ELCTR GROUNDING PAD ADULT COVIDIEN

## (undated) DEVICE — VENODYNE/SCD SLEEVE CALF MEDIUM

## (undated) DEVICE — DRSG TELFA 3 X 8

## (undated) DEVICE — BASIN SET DOUBLE

## (undated) DEVICE — DRAPE 3/4 SHEET 52X76"

## (undated) DEVICE — DRSG CURITY GAUZE SPONGE 4 X 4" 12-PLY

## (undated) DEVICE — BLADE SCALPEL SAFETYLOCK #11

## (undated) DEVICE — ELCTR BOVIE PENCIL SMOKE EVACUATION

## (undated) DEVICE — SUT SILK 3-0 30" SH

## (undated) DEVICE — PREP BETADINE SPONGE STICKS

## (undated) DEVICE — DRAPE LAPAROTOMY TRANSVERSE

## (undated) DEVICE — STAPLER SKIN VISI-STAT 35 WIDE